# Patient Record
Sex: FEMALE | Race: WHITE | Employment: OTHER | URBAN - METROPOLITAN AREA
[De-identification: names, ages, dates, MRNs, and addresses within clinical notes are randomized per-mention and may not be internally consistent; named-entity substitution may affect disease eponyms.]

---

## 2018-05-22 ENCOUNTER — TELEPHONE (OUTPATIENT)
Dept: CARDIOLOGY CLINIC | Age: 78
End: 2018-05-22

## 2018-05-22 NOTE — TELEPHONE ENCOUNTER
PATIENT CALLED NEED A REFILL ON HER  METOPROLOL NOT SURE IF SHE SHOULD   TAKING A HALF OR WHOLE EACH BOTTLE   SAYS TO DIFFERENT THINGS SHE HAS APPT   ON 6/5/18 THX.

## 2018-05-22 NOTE — TELEPHONE ENCOUNTER
Spoke with pt . Verified 2 identifers. Pt wanted to know how she should take her metoprolol medication told her active order on file states take 1 tab daily also pt requesting refill on same medication refill then pended to dr Nikolai Gibbs to approve . Patient verbalize understanding .

## 2018-05-24 RX ORDER — METOPROLOL SUCCINATE 25 MG/1
25 TABLET, EXTENDED RELEASE ORAL DAILY
Qty: 90 TAB | Refills: 3 | Status: SHIPPED | OUTPATIENT
Start: 2018-05-24 | End: 2019-12-07

## 2018-06-05 ENCOUNTER — OFFICE VISIT (OUTPATIENT)
Dept: CARDIOLOGY CLINIC | Age: 78
End: 2018-06-05

## 2018-06-05 VITALS
DIASTOLIC BLOOD PRESSURE: 75 MMHG | SYSTOLIC BLOOD PRESSURE: 134 MMHG | BODY MASS INDEX: 23.55 KG/M2 | WEIGHT: 128 LBS | HEART RATE: 65 BPM | HEIGHT: 62 IN | RESPIRATION RATE: 12 BRPM | OXYGEN SATURATION: 97 %

## 2018-06-05 DIAGNOSIS — Z86.79 S/P RF ABLATION OPERATION FOR ARRHYTHMIA: ICD-10-CM

## 2018-06-05 DIAGNOSIS — G47.20 CIRCADIAN RHYTHM SLEEP DISORDER: ICD-10-CM

## 2018-06-05 DIAGNOSIS — E78.5 DYSLIPIDEMIA: ICD-10-CM

## 2018-06-05 DIAGNOSIS — I38 VALVULAR HEART DISEASE: ICD-10-CM

## 2018-06-05 DIAGNOSIS — Z98.890 S/P RF ABLATION OPERATION FOR ARRHYTHMIA: ICD-10-CM

## 2018-06-05 DIAGNOSIS — K21.00 GASTROESOPHAGEAL REFLUX DISEASE WITH ESOPHAGITIS: ICD-10-CM

## 2018-06-05 DIAGNOSIS — R06.09 DOE (DYSPNEA ON EXERTION): ICD-10-CM

## 2018-06-05 DIAGNOSIS — F32.A DEPRESSION, UNSPECIFIED DEPRESSION TYPE: ICD-10-CM

## 2018-06-05 DIAGNOSIS — R53.82 CHRONIC FATIGUE: Primary | ICD-10-CM

## 2018-06-05 DIAGNOSIS — F17.200 TOBACCO USE DISORDER: ICD-10-CM

## 2018-06-05 NOTE — MR AVS SNAPSHOT
[address] [place] [address] 
 
 
 [address] [place] [address] 
[phone] Patient: Mercy Carpenter MRN:  [id number] Visit Information Date & Time Provider Department Dept. Phone Encounter #  
 6/5/2018 10:30 AM Taryn Reyes MD [facility] [phone] [id number] Upcoming Health Maintenance Date Due DTaP/Tdap/Td series (1 - Tdap) 11/18/1999 ZOSTER VACCINE AGE 60> 2/16/2000 GLAUCOMA SCREENING Q2Y 4/16/2005 MEDICARE YEARLY EXAM 3/14/2018 COLONOSCOPY 3/14/2018 Influenza Age 5 to Adult 8/1/2018 Allergies as of 6/5/2018  Review Complete On: 6/5/2018 By: Karissa Dee LPN Severity Noted Reaction Type Reactions Azithromycin  10/30/2009    Nausea Only Loss of appetite, jittery Fosamax [Alendronate]  10/30/2009    Other (comments) Ulcers Zocor [Simvastatin] Low 10/30/2009   Side Effect Myalgia Able to take 10 mg daily only Current Immunizations  Reviewed on 12/29/2014 Name Date Influenza Vaccine Split 10/8/2012, 11/21/2011, 10/15/2010 TD Vaccine 11/17/1999 ZZZ-RETIRED (DO NOT USE) Pneumococcal Vaccine (Unspecified Type) 8/3/2012, 8/6/2002, 1/1/1986 Not reviewed this visit You Were Diagnosed With   
  
 Codes Comments Chronic fatigue    -  Primary ICD-10-CM: R53.82 
ICD-9-CM: 780.79 S/P RF ablation operation for arrhythmia     ICD-10-CM: Z98.890, Z86.79 
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 
ICD-9-CM: 786.09 Valvular heart disease     ICD-10-CM: I38 
ICD-9-CM: 424.90 Depression, unspecified depression type     ICD-10-CM: F32.9 ICD-9-CM: 299 Gastroesophageal reflux disease with esophagitis     ICD-10-CM: K21.0 ICD-9-CM: 530.11 Tobacco use disorder     ICD-10-CM: F17.200 ICD-9-CM: 305.1 Palpitations     ICD-10-CM: R00.2 ICD-9-CM: 785.1 Vitals BP Pulse Resp Height(growth percentile) Weight(growth percentile) SpO2  
 134/75 (BP 1 Location: Right arm, BP Patient Position: Sitting) 65 12 5' 2\" (1.575 m) 128 lb (58.1 kg) 97% BMI OB Status Smoking Status 23.41 kg/m2 Hysterectomy Current Every Day Smoker Vitals History BMI and BSA Data Body Mass Index Body Surface Area  
 23.41 kg/m 2 1.59 m 2 Preferred Pharmacy Pharmacy Name [id number] - [place] Serv[place], [address] [facility] [phone] Your Updated Medication List  
  
   
This list is accurate as of 6/5/18 11:23 AM.  Always use your most recent med list.  
  
  
  
  
 aspirin delayed-release 81 mg tablet Take 81 mg by mouth daily. 1 tab Taking 3 times a week CALCIUM 600 + D 600-125 mg-unit Tab Generic drug:  calcium-cholecalciferol (d3) Take  by mouth. Takes 1/2 pill daily  
  
 clonazePAM 0.5 mg tablet Commonly known as:  Morgan Casiano Take 1 tablet by mouth nightly as needed. Indications: sleep, stress  
  
 diclofenac 1 % Gel Commonly known as:  VOLTAREN Apply 4 g to affected area four (4) times daily. doxycycline 100 mg capsule Commonly known as:  Lilia Chyle Take 1 Cap by mouth two (2) times a day. FISH OIL 1,000 mg Cap Generic drug:  omega-3 fatty acids-vitamin e Take 1,000 mg by mouth daily. fluticasone 50 mcg/actuation nasal spray Commonly known as:  FLONASE  
USE TWO SPRAYS IN EACH NOSTRIL DAILY  
  
 levothyroxine 88 mcg tablet Commonly known as:  SYNTHROID  
TAKE ONE TABLET BY MOUTH EVERY MORNING BEFORE BREAKFAST LORazepam 0.5 mg tablet Commonly known as:  ATIVAN Take  by mouth.  
  
 lovastatin 20 mg tablet Commonly known as:  MEVACOR  
TAKE ONE TABLET BY MOUTH NIGHTLY AT BEDTIME  
  
 metoprolol succinate 25 mg XL tablet Commonly known as:  TOPROL-XL Take 1 Tab by mouth daily. MILK OF MAGNESIA 400 mg/5 mL suspension Generic drug:  magnesium hydroxide Take 30 mL by mouth daily as needed. montelukast 10 mg tablet Commonly known as:  SINGULAIR  
TAKE ONE TABLET BY MOUTH ONE TIME DAILY  
  
 raloxifene 60 mg tablet Commonly known as:  EVISTA TAKE 1 TABLET BY MOUTH ONCE DAILY ON MONDAY, WEDNESDAY, AND FRIDAY  
  
 tetracycline 500 mg capsule Commonly known as:  Sandrine Del Valle Take  by mouth Before breakfast, lunch, dinner and at bedtime. traMADol 50 mg tablet Commonly known as:  ULTRAM  
Take 50 mg by mouth every six (6) hours as needed for Pain. traZODone 50 mg tablet Commonly known as:  DESYREL  
TAKE ONE TABLET BY MOUTH NIGHTLY AT BEDTIME  
  
 TYLENOL EXTRA STRENGTH 500 mg tablet Generic drug:  acetaminophen Take 500 mg by mouth every six (6) hours as needed. Indications: ARTHRITIC PAIN  
  
 VENTOLIN HFA 90 mcg/actuation inhaler Generic drug:  albuterol Inhale two puffs by mouth every 4 to 6 hours as needed shortness of breath We Performed the Following AMB POC EKG ROUTINE W/ 12 LEADS, INTER & REP [68947 CPT(R)] Introducing [facility]! [employer] introduces Trustpilot patient portal. Now you can access parts of your medical record, email your doctor's office, and request medication refills online. 1. In your internet browser, go to [email or web address] 2. Click on the First Time User? Click Here link in the Sign In box. You will see the New Member Sign Up page. 3. Enter your Trustpilot Access Code exactly as it appears below. You will not need to use this code after youve completed the sign-up process. If you do not sign up before the expiration date, you must request a new code. · Trustpilot Access Code: [email or web address] Expires: 9/3/2018 11:21 AM 
 
4.  Enter the last four digits of your Social Security Number (xxxx) and Date of Birth (mm/dd/yyyy) as indicated and click Submit. You will be taken to the next sign-up page. 5. Create a Heliatek ID. This will be your Heliatek login ID and cannot be changed, so think of one that is secure and easy to remember. 6. Create a Heliatek password. You can change your password at any time. 7. Enter your Password Reset Question and Answer. This can be used at a later time if you forget your password. 8. Enter your e-mail address. You will receive e-mail notification when new information is available in [address] 9. Click Sign Up. You can now view and download portions of your medical record. 10. Click the Download Summary menu link to download a portable copy of your medical information. If you have questions, please visit the Frequently Asked Questions section of the Heliatek website. Remember, Heliatek is NOT to be used for urgent needs. For medical emergencies, dial 911. Now available from your iPhone and Android! Please provide this summary of care documentation to your next provider. Your primary care clinician is listed as Cristian Fernandez. If you have any questions after today's visit, please call [phone].

## 2018-06-05 NOTE — PATIENT INSTRUCTIONS
-- Please reduce the Metoprolol XL to 1/2 tablet; please call us in 2 weeks to let us know how this change is working  -- 1 year follow up    Heart-Healthy Diet: Care Instructions  Your Care Instructions    A heart-healthy diet has lots of vegetables, fruits, nuts, beans, and whole grains, and is low in salt. It limits foods that are high in saturated fat, such as meats, cheeses, and fried foods. It may be hard to change your diet, but even small changes can lower your risk of heart attack and heart disease. Follow-up care is a key part of your treatment and safety. Be sure to make and go to all appointments, and call your doctor if you are having problems. It's also a good idea to know your test results and keep a list of the medicines you take. How can you care for yourself at home? Watch your portions  · Learn what a serving is. A \"serving\" and a \"portion\" are not always the same thing. Make sure that you are not eating larger portions than are recommended. For example, a serving of pasta is ½ cup. A serving size of meat is 2 to 3 ounces. A 3-ounce serving is about the size of a deck of cards. Measure serving sizes until you are good at [place]" them. Keep in mind that restaurants often serve portions that are 2 or 3 times the size of one serving. · To keep your energy level up and keep you from feeling hungry, eat often but in smaller portions. · Eat only the number of calories you need to stay at a healthy weight. If you need to lose weight, eat fewer calories than your body burns (through exercise and other physical activity). Eat more fruits and vegetables  · Eat a variety of fruit and vegetables every day. Dark green, deep orange, red, or yellow fruits and vegetables are especially good for you. Examples include spinach, carrots, peaches, and berries. · Keep carrots, celery, and other veggies handy for snacks.  Buy fruit that is in season and store it where you can see it so that you will be tempted to eat it. · Cook dishes that have a lot of veggies in them, such as stir-fries and soups. Limit saturated and trans fat  · Read food labels, and try to avoid saturated and trans fats. They increase your risk of heart disease. Trans fat is found in many processed foods such as cookies and crackers. · Use olive or canola oil when you cook. Try cholesterol-lowering spreads, such as Benecol or Take Control. · Bake, broil, grill, or steam foods instead of frying them. · Choose lean meats instead of high-fat meats such as hot dogs and sausages. Cut off all visible fat when you prepare meat. · Eat fish, skinless poultry, and meat alternatives such as soy products instead of high-fat meats. Soy products, such as tofu, may be especially good for your heart. · Choose low-fat or fat-free milk and dairy products. Eat fish  · Eat at least two servings of fish a week. Certain fish, such as salmon and tuna, contain omega-3 fatty acids, which may help reduce your risk of heart attack. Eat foods high in fiber  · Eat a variety of grain products every day. Include whole-grain foods that have lots of fiber and nutrients. Examples of whole-grain foods include oats, whole wheat bread, and brown rice. · Buy whole-grain breads and cereals, instead of white bread or pastries. Limit salt and sodium  · Limit how much salt and sodium you eat to help lower your blood pressure. · Taste food before you salt it. Add only a little salt when you think you need it. With time, your taste buds will adjust to less salt. · Eat fewer snack items, fast foods, and other high-salt, processed foods. Check food labels for the amount of sodium in packaged foods. · Choose low-sodium versions of canned goods (such as soups, vegetables, and beans). Limit sugar  · Limit drinks and foods with added sugar. These include candy, desserts, and soda pop.   Limit alcohol  · Limit alcohol to no more than 2 drinks a day for men and 1 drink a day for women. Too much alcohol can cause health problems. When should you call for help? Watch closely for changes in your health, and be sure to contact your doctor if:  ? · You would like help planning heart-healthy meals. Where can you learn more? Go to http://marcel-veronica.info/. Enter V137 in the search box to learn more about \"Heart-Healthy Diet: Care Instructions. \"  Current as of: September 21, 2016  Content Version: 11.4  © 9294-0037 [employer], [employer]. Care instructions adapted under license by [employer] (which disclaims liability or warranty for this information). If you have questions about a medical condition or this instruction, always ask your healthcare professional. [address] any warranty or liability for your use of this information.

## 2018-06-05 NOTE — PROGRESS NOTES
Chief Complaint   Patient presents with    Shortness of Breath     pt c/o headache, off balance       1. Have you been to the ER, urgent care clinic since your last visit? Hospitalized since your last visit? No    2. Have you seen or consulted any other health care providers outside of the [address] [place] [address] since your last visit? Include any pap smears or colon screening.  No

## 2018-06-05 NOTE — PROGRESS NOTES
[facility]   [address] [facility], [address] [place] [address]                                          NEW PATIENT HPI/FOLLOW-UP      NAME:  Chel Ruiz   :   [id number]   MRN:   [id number]   PCP:  Rimma Flores MD           Subjective: The patient is a 66y.o. year old female with h/o Depression/anxiety, GERD, mitral valve disorder, pSVT s/p RF ablation, hypothyroidism, vitamin D deficiency, tobacco use disorder and circadian rhythm disorder who returns for a routine follow-up. Since the last visit, patient reports fatigue, which is chronic and not related to activity. She reports long term trouble sleeping and using Clonazepam at night to help her sleep. She mostly does embroidering, but states \"I'm able to keep the household chores up\" and she gets out into the garden as much as the weather allows. She reports some NEGRO with activity and admits to continued smoking, maybe 4 cigarettes/day. She notes one episode of \"anxiety\" but cannot clearly state if there was an emotional trigger or if she felt palpitations during this episode. Denies change in exercise tolerance, chest pain, edema, medication intolerance, palpitations, PND/orthopnea, wheezing, sputum, syncope, dizziness or light headedness. Doing satisfactorily. Review of Systems  General: Pt denies excessive weight gain or loss. Pt is able to conduct ADL's. Respiratory: +shortness of breath, NEGRO, Denies wheezing or stridor.   Cardiovascular: Denies precordial pain, palpitations, edema or PND  Gastrointestinal: Denies poor appetite, indigestion, abdominal pain or blood in stool  Peripheral vascular: Denies claudication, leg cramps  Neuropsychiatric: +fatigue Denies paresthesias,tingling,numbness,anxiety,depression,  Musculoskeletal: Denies pain,tenderness, soreness,swelling      Past Medical History:   Diagnosis Date    Allergy, unspecified not elsewhere classified     Anxiety state, unspecified     Breast calcification seen on mammogram 2011    bilaterally.  Bursitis of hip     with Tendonitis. Dr. Cristina Tee. Dr. Keith Barillas. Dr. Allyson Romano.  Cataract 2013    bilaterally. Dr. Nataliya Pang.  COPD     Dry eye syndrome     Dr. Trinh Dempsey Gallstone 2002    GERD (gastroesophageal reflux disease)     H. pylori positive Txd. Dr. Ann Pedlar    Heart murmur 12/2011    Moderate MR. Moderate TR. Moderate AoR. Normal Stress Test.  Dr. Michelle Naranjo.  Hemorrhoids, internal 06/2003    Dr. Bharti Llamas Hiatal hernia     Dr. Bharti Llamas Hyperthyroidism 1980s    Txd with APARICIO    IBS (irritable bowel syndrome) 1958    Intractable ophthalmic migraine     Lactose intolerance     Lyme disease 09/2011    Txd with Doxy x 21. Dr. Dori Mario Osteopenia 12/2002, 09/2004,8/2012    Dr. Philip Monsivais.  Other and unspecified hyperlipidemia     Paroxysmal SVT (supraventricular tachycardia) ([facility] [address] 09/01/14    Dr. Anna Barajas. Dr. Mason Muir.    Polyuria 2013    Psoriasis 08/2011    Left ear. Dr. Anny Plunkett RBBB (right bundle branch block) 12/2011    Dr. Anna Barajas. Normal Stress Test 12/2011.  SBO (small bowel obstruction) ([address] 1970s    Unspecified hypothyroidism 1980s    Valvular heart disease 2014    mild-mod MR.  TR.  AI.   Dr. Ayaz Spann D deficiency 06/2011     Patient Active Problem List    Diagnosis Date Noted    S/P RF ablation operation for SVT arrhythmia 2015 06/05/2018    Paroxysmal SVT (supraventricular tachycardia) ([address] 12/22/2014    Rapid palpitations 12/22/2014    Circadian rhythm sleep disorder 12/22/2014    Depression-anxiety 12/11/2014    Fatigue 12/11/2014    Valvular heart disease--mild-mod MR/TR/AI 09/11/2014    DJD (degenerative joint disease)--hips--painful 09/11/2014    Palpitation 09/10/2014    Chronic insomnia 08/26/2014    COPD (chronic obstructive pulmonary disease) ([facility] [address] 08/26/2014    Breast calcification seen on mammogram     Tobacco use disorder 02/03/2014    Dyslipidemia 02/03/2014    Chronic airway obstruction, not elsewhere classified 02/03/2014    NEGRO (dyspnea on exertion) 02/03/2014    Hypothyroid 11/18/2013    Vitamin D deficiency 11/18/2013    Mitral valve disorders(424.0) 06/28/2012    Headache(784.0) 10/06/2011    Psoriasis 08/01/2011    Intractable ophthalmic migraine     Dry eye syndrome     Anxiety state, unspecified     GERD (gastroesophageal reflux disease)     Hemorrhoids, internal 06/01/2003      Past Surgical History:   Procedure Laterality Date    APPENDECTOMY  1957    COLONOSCOPY  06/2003    Dr. Gia Madison. due q 10 yrs    HX BREAST BIOPSY  2011    Right. [facility]. [facility].  HX CATARACT REMOVAL Bilateral 10/15/2013 (Right), 11/06/2013 (Left)    Dr. Mackenzie Harris  [id number]    Dr. Mattie MD    HX LAP CHOLECYSTECTOMY      HX ORTHOPAEDIC      some type of foot surgery 30 yrs ago    HX ARCELIA AND BSO  1973    due to fibroids then SBO    Bill Mendoza  2002    Dr. Ashley Davalos.  [place] COLSC FLX W/RMVL OF TUMOR POLYP LESION SNARE TQ  3/14/2013         [place] COLSC FLX W/RMVL OF TUMOR POLYP LESION SNARE TQ  3/14/2013          Allergies   Allergen Reactions    Azithromycin Nausea Only     Loss of appetite, jittery    Fosamax [Alendronate] Other (comments)     Ulcers    Zocor [Simvastatin] Myalgia     Able to take 10 mg daily only      Family History   Problem Relation Age of Onset    Heart Disease Mother      chf    Hypertension Mother     Arthritis-osteo Mother     Stroke Mother     Thyroid Disease Sister     Diabetes Maternal Grandmother     Kidney Disease Other      tumor present      Social History     Social History    Marital status:      Spouse name: N/A    Number of children: N/A    Years of education: N/A     Occupational History    Not on file.      Social History Main Topics    Smoking status: Current Every Day Smoker     Packs/day: 0.30     Years: 50.00     Types: Cigarettes    Smokeless tobacco: Never Used      Comment: 2 cigarettes daily    Alcohol use No    Drug use: No    Sexual activity: Not Currently     Other Topics Concern    Not on file     Social History Narrative      Current Outpatient Prescriptions   Medication Sig    raloxifene (EVISTA) 60 mg tablet TAKE 1 TABLET BY MOUTH ONCE DAILY ON MONDAY, WEDNESDAY, AND FRIDAY    LORazepam (ATIVAN) 0.5 mg tablet Take  by mouth.  traMADol (ULTRAM) 50 mg tablet Take 50 mg by mouth every six (6) hours as needed for Pain.  montelukast (SINGULAIR) 10 mg tablet TAKE ONE TABLET BY MOUTH ONE TIME DAILY     fluticasone (FLONASE) 50 mcg/actuation nasal spray USE TWO SPRAYS IN EACH NOSTRIL DAILY     VENTOLIN HFA 90 mcg/actuation inhaler Inhale two puffs by mouth every 4 to 6 hours as needed shortness of breath    lovastatin (MEVACOR) 20 mg tablet TAKE ONE TABLET BY MOUTH NIGHTLY AT BEDTIME     levothyroxine (SYNTHROID) 88 mcg tablet TAKE ONE TABLET BY MOUTH EVERY MORNING BEFORE BREAKFAST (Patient taking differently: 100 mcg. TAKE ONE TABLET BY MOUTH EVERY MORNING BEFORE BREAKFAST)    clonazePAM (KLONOPIN) 0.5 mg tablet Take 1 tablet by mouth nightly as needed. Indications: sleep, stress    calcium-cholecalciferol, d3, (CALCIUM 600 + D) 600-125 mg-unit tab Take  by mouth. Takes 1/2 pill daily    aspirin delayed-release 81 mg tablet Take 81 mg by mouth daily. 1 tab Taking 3 times a week    acetaminophen (TYLENOL EXTRA STRENGTH) 500 mg tablet Take 500 mg by mouth every six (6) hours as needed. Indications: ARTHRITIC PAIN    magnesium hydroxide (MILK OF MAGNESIA) 400 mg/5 mL suspension Take 30 mL by mouth daily as needed.  omega-3 fatty acids-vitamin e (FISH OIL) 1,000 mg Cap Take 1,000 mg by mouth daily.  metoprolol succinate (TOPROL-XL) 25 mg XL tablet Take 1 Tab by mouth daily.     doxycycline (MONODOX) 100 mg capsule Take 1 Cap by mouth two (2) times a day.  tetracycline (ACHROMYCIN, SUMYCIN) 500 mg capsule Take  by mouth Before breakfast, lunch, dinner and at bedtime.  traZODone (DESYREL) 50 mg tablet TAKE ONE TABLET BY MOUTH NIGHTLY AT BEDTIME     diclofenac (VOLTAREN) 1 % topical gel Apply 4 g to affected area four (4) times daily. No current facility-administered medications for this visit. I have reviewed the nurses notes, vitals, problem list, allergy list, medical history, family medical, social history and medications. Objective:     Physical Exam:     Vitals:    06/05/18 1036 06/05/18 1041   BP: 133/69 134/75   Pulse: 63 65   Resp: 12    SpO2: 97%    Weight: 128 lb (58.1 kg)    Height: 5' 2\" (1.575 m)     Body mass index is 23.41 kg/(m^2). General: WDWN elderly woman, in no acute distress. Pleasant affect. HEENT: No carotid bruits, no JVD, trach is midline. Heart:  Normal S1/S2 negative S3 or S4. Regular, no murmur, gallop or rub.   Respiratory: Clear bilaterally, no wheezing or rales  Abdomen:   Soft, non-tender, bowel sounds are active.   Extremities:  No edema, normal cap refill, no cyanosis. Neuro: A&Ox3, speech clear, gait stable. Skin: Skin color is normal. No rashes or lesions. No diaphoresis. Vascular: 2+ pulses symmetric in all extremities        Data Review:       Cardiographics:    EKG interpretation:  Rhythm: sinus bradycardia; and regular . Rate (approx.): 57; Axis: normal; P wave: prolonged, consider LAE; QRS interval: RBBB; ST/T wave: normal. This EKG was interpreted by Edmund Villagomez PA-C.       Cardiology Labs:    Results for orders placed or performed during the hospital encounter of 09/01/14   EKG, 12 LEAD, INITIAL   Result Value Ref Range    Ventricular Rate 78 BPM    Atrial Rate 78 BPM    P-R Interval 168 ms    QRS Duration 116 ms    Q-T Interval 406 ms    QTC Calculation (Bezet) 462 ms    Calculated P Axis 48 degrees    Calculated R Axis 23 degrees    Calculated T Axis 36 degrees    Diagnosis       Normal sinus rhythm  Right bundle branch block  No previous ECGs available  Confirmed by Jimi Kamara ([id number]) on 9/2/2014 10:24:39 PM       Lab Results   Component Value Date/Time    Cholesterol, total 181 08/26/2014 09:55 AM    HDL Cholesterol 63 08/26/2014 09:55 AM    LDL, calculated 90 08/26/2014 09:55 AM    Triglyceride 140 08/26/2014 09:55 AM    CHOL/HDL Ratio 3.2 05/06/2010 08:16 AM       Lab Results   Component Value Date/Time    Sodium 143 01/19/2015 10:18 AM    Potassium 4.7 01/19/2015 10:18 AM    Chloride 103 01/19/2015 10:18 AM    CO2 24 01/19/2015 10:18 AM    Anion gap 5 09/01/2014 08:45 PM    Glucose 75 01/19/2015 10:18 AM    BUN 14 01/19/2015 10:18 AM    Creatinine 0.81 01/19/2015 10:18 AM    BUN/Creatinine ratio 17 01/19/2015 10:18 AM    GFR est AA 83 01/19/2015 10:18 AM    GFR est non-AA 72 01/19/2015 10:18 AM    Calcium 9.1 01/19/2015 10:18 AM    Bilirubin, total 0.2 01/19/2015 10:18 AM    AST (SGOT) 21 01/19/2015 10:18 AM    Alk. phosphatase 56 01/19/2015 10:18 AM    Protein, total 6.0 01/19/2015 10:18 AM    Albumin 4.1 01/19/2015 10:18 AM    Globulin 3.5 09/01/2014 08:45 PM    A-G Ratio 2.2 01/19/2015 10:18 AM    ALT (SGPT) 13 01/19/2015 10:18 AM          Assessment:       ICD-10-CM ICD-9-CM    1. Chronic fatigue R53.82 780.79    2. S/P RF ablation operation for SVT arrhythmia 2015 Z98.890 V45.89     Z86.79     3. Circadian rhythm sleep disorder G47.20 327.30    4. Dyslipidemia E78.5 272.4 AMB POC EKG ROUTINE W/ 12 LEADS, INTER & REP   5. NEGRO (dyspnea on exertion) R06.09 786.09    6. Valvular heart disease--mild-mod MR/TR/AI I38 424.90    7. Depression, unspecified depression type F32.9 311    8. Gastroesophageal reflux disease with esophagitis K21.0 530.11    9. Tobacco use disorder F17.200 305.1          Discussion: Patient presents at this time stable from a cardiac perspective. BP was well controlled; radial pulses equal bilaterally.  Chronic fatigue could have many sources including medications, depression, hypothyroidism, palpitations/arrhythmia, heart failure, COPD (pt is long time and current smoker) or sleep disturbance. Thyroid seems to be well followed and managed by PCP. Pt denies palpitations or at rest symptoms of CP or SOB, no edema. Doubt CHF. Pt offered that she and her  will be celebrating their 61st wedding anniversary in July. Congratulations were given. Pt states she is very proud of this. Pleased with present status. Discussed/seen with Dr. Teresa Marquis: 1. Decrease Metoprolol XL to 12.5 mg daily    2. Lipid profile and labs followed by PCP. 3. Encouraged to exercise to tolerance, stop smoking and follow low fat, low cholesterol, low sodium predominantly Plant-based (consider Mediterranean) diet. 4.Follow up: 1 year   --  Call with questions or concerns. I have discussed the diagnosis with the patient and the intended plan as seen in the above orders. The patient has received an after-visit summary and questions were answered concerning future plans. I have discussed any concerning medication side effects and warnings with the patient as well. Patient seen and examined. All pertinent data reviewed. I have reviewed detailed note as outlined by Natalie Yanez. Case discussed with Nursing/medical assistant staff and Natalie Yanez. Patient c/o ongoing chronic fatigue--multifactorial. Suspect med interactions contributing. Will reduce BB to 25 mg qday. Patient states she will discuss reduction tramadol for headache and clonazepam for sleep with PCP. Plans as outlined.       Deanna Roman PA-C  6/5/2018     RICKIE Pruitt

## 2019-04-22 ENCOUNTER — HOSPITAL ENCOUNTER (OUTPATIENT)
Dept: MRI IMAGING | Age: 79
Discharge: HOME | End: 2019-04-22
Attending: NEUROLOGICAL SURGERY
Payer: MEDICARE

## 2019-04-22 DIAGNOSIS — G93.9 BRAIN LESION: ICD-10-CM

## 2019-04-22 PROCEDURE — A9575 INJ GADOTERATE MEGLUMI 0.1ML: HCPCS | Performed by: NEUROLOGICAL SURGERY

## 2019-04-22 PROCEDURE — 74011636320 HC RX REV CODE- 636/320: Performed by: NEUROLOGICAL SURGERY

## 2019-04-22 PROCEDURE — 70553 MRI BRAIN STEM W/O & W/DYE: CPT

## 2019-04-22 RX ADMIN — GADOTERATE MEGLUMINE 15 ML: 376.9 INJECTION INTRAVENOUS at 09:00

## 2019-05-20 ENCOUNTER — OFFICE VISIT (OUTPATIENT)
Dept: URGENT CARE | Age: 79
End: 2019-05-20

## 2019-05-20 VITALS
RESPIRATION RATE: 18 BRPM | HEART RATE: 64 BPM | SYSTOLIC BLOOD PRESSURE: 166 MMHG | HEIGHT: 62 IN | BODY MASS INDEX: 24.48 KG/M2 | OXYGEN SATURATION: 97 % | DIASTOLIC BLOOD PRESSURE: 70 MMHG | TEMPERATURE: 97.4 F | WEIGHT: 133 LBS

## 2019-05-20 DIAGNOSIS — S30.0XXA CONTUSION OF SACRUM, INITIAL ENCOUNTER: ICD-10-CM

## 2019-05-20 DIAGNOSIS — M54.9 ACUTE MIDLINE BACK PAIN, UNSPECIFIED BACK LOCATION: Primary | ICD-10-CM
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
## 2019-05-20 NOTE — PROGRESS NOTES
Pt states she tripped and fell onto her bottom 2 weeks ago in her home and has had some pain in her low back since then. Called and Dr called in some diclofenac but no better. Called  today and told her to come in here. Also, tick bit over the weekend. No aching or rash. Pulled off. Less than a day of exposure, no engorged. The history is provided by the patient. Fall   This is a new problem. The current episode started more than 1 week ago. The problem occurs constantly. The problem has not changed since onset. Pertinent negatives include no chest pain, no abdominal pain, no headaches and no shortness of breath. Exacerbated by: cetain activities and certain ways of sitting. Nothing relieves the symptoms. Treatments tried: NSAID and ice. The treatment provided no relief. Past Medical History:   Diagnosis Date    Allergy, unspecified not elsewhere classified     Anxiety state, unspecified     Breast calcification seen on mammogram 2011    bilaterally.  Bursitis of hip     with Tendonitis. Dr. Ayala Moore. Dr. Stephanie Aguilar. Dr. Agustina Perez.  Cataract 2013    bilaterally. Dr. Ronaldo Nesbitt.  COPD     Dry eye syndrome     Dr. Lana Augustin Gallstone 2002    GERD (gastroesophageal reflux disease)     H. pylori positive Txd. Dr. Tj Gómez    Heart murmur 12/2011    Moderate MR. Moderate TR. Moderate AoR. Normal Stress Test.  Dr. Vanessa Jimenez.  Hemorrhoids, internal 06/2003    Dr. Victoria Code Hiatal hernia     Dr. Victoria Code Hyperthyroidism 1980s    Txd with APARICIO    IBS (irritable bowel syndrome) 1958    Intractable ophthalmic migraine     Lactose intolerance     Lyme disease 09/2011    Txd with Doxy x 21. Dr. Lynn Chol Osteopenia 12/2002, 09/2004,8/2012    Dr. Malou Garcia.  Other and unspecified hyperlipidemia     Paroxysmal SVT (supraventricular tachycardia) ([facility] [address] 09/01/14    Dr. Kyree Bell.   Dr. Jasmyn Crowley.    Polyuria 2013    Psoriasis 08/2011 Left ear. Dr. Matthew Day RBBB (right bundle branch block) 12/2011    Dr. Vinh Johnson. Normal Stress Test 12/2011.  SBO (small bowel obstruction) ([facility] [address] 1970s    Unspecified hypothyroidism 1980s    Valvular heart disease 2014    mild-mod MR.  TRBritton BARRON. Dr. Melissa Figueroa D deficiency 06/2011        Past Surgical History:   Procedure Laterality Date   [address] COLONOSCOPY  06/2003    Dr. Mitchell Miller. due q 10 yrs    HX BREAST BIOPSY  2011    [facility]. [facility]. [facility].  HX CATARACT REMOVAL Bilateral 10/15/2013 (Right), 11/06/2013 (Left)    Dr. Caro Dickson  [id number]    Dr. Adalid Garrett MD    HX LAP CHOLECYSTECTOMY      HX ORTHOPAEDIC      some type of foot surgery 30 yrs ago    HX ARCELIA AND BSO  1973    due to fibroids then SBO    Khurram Brayan  2002    Dr. Dorys Hill.     [place] COLSC FLX W/RMVL OF TUMOR POLYP LESION SNARE TQ  3/14/2013         [place] COLSC FLX W/RMVL OF TUMOR POLYP LESION SNARE TQ  3/14/2013              Family History   Problem Relation Age of Onset    Heart Disease Mother         chf    Hypertension Mother     Arthritis-osteo Mother     Stroke Mother     Thyroid Disease Sister     Diabetes Maternal Grandmother     Kidney Disease Other         tumor present        Social History     Socioeconomic History    Marital status:      Spouse name: Not on file    Number of children: Not on file    Years of education: Not on file    Highest education level: Not on file   Occupational History    Not on file   Social Needs    Financial resource strain: Not on file    Food insecurity:     Worry: Not on file     Inability: Not on file    Transportation needs:     Medical: Not on file     Non-medical: Not on file   Tobacco Use    Smoking status: Current Every Day Smoker     Packs/day: 0.30     Years: 50.00     Pack years: 15.00     Types: Cigarettes    Smokeless tobacco: Never Used   [address] [place] [address] Tobacco comment: 2 cigarettes daily   Substance and Sexual Activity    Alcohol use: No    Drug use: No    Sexual activity: Not Currently   Lifestyle    Physical activity:     Days per week: Not on file     Minutes per session: Not on file    Stress: Not on file   Relationships    Social connections:     Talks on phone: Not on file     Gets together: Not on file     Attends Sikhism service: Not on file     Active member of club or organization: Not on file     Attends meetings of clubs or organizations: Not on file     Relationship status: Not on file    Intimate partner violence:     Fear of current or ex partner: Not on file     Emotionally abused: Not on file     Physically abused: Not on file     Forced sexual activity: Not on file   Other Topics Concern    Not on file   Social History Narrative    Not on file                ALLERGIES: Azithromycin; Fosamax [alendronate]; and Zocor [simvastatin]    Review of Systems   Constitutional: Negative. Respiratory: Negative. Negative for shortness of breath. Cardiovascular: Negative for chest pain. Gastrointestinal: Negative for abdominal pain. Genitourinary: Negative. No loss of bowel or bladder function   Musculoskeletal: Positive for back pain. Skin: Negative. Tick bite left medial knee   Neurological: Negative for weakness, numbness and headaches. No numbness or perianal numbness        Vitals:    05/20/19 1039 05/20/19 1042   BP: 180/76 166/70   Pulse: 64    Resp: 18    Temp: 97.4 °F (36.3 °C)    SpO2: 97%    Weight: 133 lb (60.3 kg)    Height: 5' 2\" (1.575 m)        Physical Exam   Constitutional: She is oriented to person, place, and time. She appears well-developed and well-nourished. HENT:   Head: Normocephalic and atraumatic. Mouth/Throat: Oropharynx is clear and moist. No oropharyngeal exudate. Eyes: Pupils are equal, round, and reactive to light.  Conjunctivae and EOM are normal. Right eye exhibits no discharge. Left eye exhibits no discharge. No scleral icterus. Neck: Normal range of motion. No tracheal deviation present. No thyromegaly present. Cardiovascular: Normal rate, regular rhythm and normal heart sounds. No murmur heard. Pulmonary/Chest: Effort normal and breath sounds normal. No respiratory distress. She has no wheezes. She has no rales. Abdominal: Soft. Bowel sounds are normal. She exhibits no distension. There is no tenderness. There is no rebound and no guarding. Musculoskeletal: Normal range of motion. She exhibits no edema, tenderness or deformity. No pain on palpation of the sacrum    Lymphadenopathy:     She has no cervical adenopathy. Neurological: She is alert and oriented to person, place, and time. She has normal reflexes. She displays normal reflexes. No cranial nerve deficit. She exhibits normal muscle tone. Coordination normal.   Stable gait, NVI distally   Skin: Skin is warm. No rash noted. No erythema. No bruises, small tick bite noted with scabbing present,  no FB, Mild local blanchable erythema. No erythema migrans. Area is NT. Psychiatric: She has a normal mood and affect. Her behavior is normal. Judgment and thought content normal.   Nursing note and vitals reviewed.       MDM    Procedures

## 2019-05-20 NOTE — PATIENT INSTRUCTIONS
Rest and seek medical care for increased problems, any questions or concern including but not limited to the ones discussed with you here today. If you notice increase redness or and aching please call your doctor as you may need to be started on antibiotics but as your exposure was brief and there appears to only be a local reaction, I do not think you need antibiotics at this point. Also, use tylenol as directed for your back pain and follow up with your doctor as you may need advanced imaging and testing of pain persists in your back. Tick Bite: Care Instructions  Your Care Instructions    Ticks are small spiderlike animals. They bite to fasten themselves onto your skin and feed on your blood. Ticks can carry diseases. But most ticks do not carry diseases, and most tick bites do not cause serious health problems. Some people may have an allergic reaction to a tick bite. This reaction may be mild, with symptoms like itching and swelling. In rare cases, a severe allergic reaction may occur. Most of the time, all you need to do for a tick bite is relieve any symptoms you may have. Follow-up care is a key part of your treatment and safety. Be sure to make and go to all appointments, and call your doctor if you are having problems. It's also a good idea to know your test results and keep a list of the medicines you take. How can you care for yourself at home? · Put ice or a cold pack on the bite for 15 to 20 minutes once an hour. Put a thin cloth between the ice and your skin. · Try an over-the-counter medicine to relieve itching, redness, swelling, and pain. Be safe with medicines. Read and follow all instructions on the label. ? Take an antihistamine medicine, such as a nondrowsy one like loratadine (Claritin) or one that might make you sleepy like diphenhydramine (Benadryl). These medicines may help relieve itching, redness, and swelling. ?  Use a spray of local anesthetic that contains benzocaine, such as Solarcaine. It may help relieve pain. If your skin reacts to the spray, stop using it. ? Put calamine lotion on the skin. It may help relieve itching. To avoid tick bites  · Avoid ticks:  ? Learn where ticks are found in your community, and stay away from those areas if possible. ? Cover as much of your body as possible when you work or play in grassy or wooded areas. ? Use insect repellents, such as products containing DEET. You can spray them on your skin. ? Take steps to control ticks on your property if you live in an area where Lyme disease occurs. Clear leaves, brush, tall grasses, woodpiles, and stone fences from around your house and the edges of your yard or garden. This may help get rid of ticks. · When you come in from outdoors, check your body for ticks, including your groin, head, and underarms. The ticks may be about the size of a sesame seed. If no one else can help you check for ticks on your scalp, comb your hair with a fine-tooth comb. · If you find a tick, remove it quickly. Use tweezers to grasp the tick as close to its mouth (the part in your skin) as possible. Slowly pull the tick straight out--do not twist or yank--until its mouth releases from your skin. If part of the tick stays in the skin, leave it alone. It will likely come out on its own in a few days. · Ticks can come into your house on clothing, outdoor gear, and pets. These ticks can fall off and attach to you. ? Check your clothing and outdoor gear. Remove any ticks you find. Then put your clothing in a clothes dryer on high heat for 1 hour to kill any ticks that might remain. ? Check your pets for ticks after they have been outdoors. · When hiking in the woods, carry a small dry jar or ziplock bag. If you find a tick on your body, remove the tick and put it in the jar or bag. Store the container in the freezer so you can give it to your doctor if symptoms develop.  The tick can be tested to learn whether it is carrying the bacteria that cause Lyme disease. When should you call for help? Call 911 anytime you think you may need emergency care. For example, call if:    · You have symptoms of a severe allergic reaction. These may include:  ? Sudden raised, red areas (hives) all over your body. ? Swelling of the throat, mouth, lips, or tongue. ? Trouble breathing. ? Passing out (losing consciousness). Or you may feel very lightheaded or suddenly feel weak, confused, or restless.    Call your doctor now or seek immediate medical care if:    · You have signs of infection, such as:  ? Increased pain, swelling, warmth, or redness around the bite. ? Red streaks leading from the bite. ? Pus draining from the bite. ? A fever.    Watch closely for changes in your health, and be sure to contact your doctor if:    · You develop a new rash.     · You have joint pain.     · You are very tired.     · You have flu-like symptoms.     · You have symptoms for more than 1 week. Where can you learn more? Go to http://marcel-veronica.info/. Enter V842 in the search box to learn more about \"Tick Bite: Care Instructions. \"  Current as of: September 23, 2018  Content Version: 11.9  © 3227-7342 [employer]. Care instructions adapted under license by [employer] (which disclaims liability or warranty for this information). If you have questions about a medical condition or this instruction, always ask your healthcare professional. [place] [address] any warranty or liability for your use of this information. Bruises: Care Instructions  Your Care Instructions    Bruises occur when small blood vessels under the skin tear or rupture, most often from a twist, bump, or fall. Blood leaks into tissues under the skin and causes a black-and-blue spot that often turns colors, including purplish black, reddish blue, or yellowish green, as the bruise heals.   Bruises hurt, but most are not serious and will go away on their own within 2 to 4 weeks. Sometimes, gravity causes them to spread down the body. A leg bruise usually will take longer to heal than a bruise on the face or arms. Follow-up care is a key part of your treatment and safety. Be sure to make and go to all appointments, and call your doctor if you are having problems. It's also a good idea to know your test results and keep a list of the medicines you take. How can you care for yourself at home? · Take pain medicines exactly as directed. ? If the doctor gave you a prescription medicine for pain, take it as prescribed. ? If you are not taking a prescription pain medicine, ask your doctor if you can take an over-the-counter medicine. · Put ice or a cold pack on the area for 10 to 20 minutes at a time. Put a thin cloth between the ice and your skin. · If you can, prop up the bruised area on pillows as much as possible for the next few days. Try to keep the bruise above the level of your heart. When should you call for help? Call your doctor now or seek immediate medical care if:    · You have signs of infection, such as:  ? Increased pain, swelling, warmth, or redness. ? Red streaks leading from the bruise. ? Pus draining from the bruise. ? A fever.     · You have a bruise on your leg and signs of a blood clot, such as:  ? Increasing redness and swelling along with warmth, tenderness, and pain in the bruised area. ? Pain in your calf, back of the knee, thigh, or groin. ? Redness and swelling in your leg or groin.     · Your pain gets worse.    Watch closely for changes in your health, and be sure to contact your doctor if:    · You do not get better as expected. Where can you learn more? Go to http://marcel-veronica.info/. Enter [phone] [phone] in the search box to learn more about \"Bruises: Care Instructions. \"  Current as of: September 23, 2018  Content Version: 11.9  © 3148-1521 [employer].  Care instructions adapted under license by [employer] (which disclaims liability or warranty for this information). If you have questions about a medical condition or this instruction, always ask your healthcare professional. [address] any warranty or liability for your use of this information. Back Pain: Care Instructions  Your Care Instructions    Back pain has many possible causes. It is often related to problems with muscles and ligaments of the back. It may also be related to problems with the nerves, discs, or bones of the back. Moving, lifting, standing, sitting, or sleeping in an awkward way can strain the back. Sometimes you don't notice the injury until later. Arthritis is another common cause of back pain. Although it may hurt a lot, back pain usually improves on its own within several weeks. Most people recover in 12 weeks or less. Using good home treatment and being careful not to stress your back can help you feel better sooner. Follow-up care is a key part of your treatment and safety. Be sure to make and go to all appointments, and call your doctor if you are having problems. It's also a good idea to know your test results and keep a list of the medicines you take. How can you care for yourself at home? · Sit or lie in positions that are most comfortable and reduce your pain. Try one of these positions when you lie down:  ? Lie on your back with your knees bent and supported by large pillows. ? Lie on the floor with your legs on the seat of a sofa or chair. ? Lie on your side with your knees and hips bent and a pillow between your legs. ? Lie on your stomach if it does not make pain worse. · Do not sit up in bed, and avoid soft couches and twisted positions. Bed rest can help relieve pain at first, but it delays healing. Avoid bed rest after the first day of back pain. · Change positions every 30 minutes.  If you must sit for long periods of time, take breaks from sitting. Get up and walk around, or lie in a comfortable position. · Try using a heating pad on a low or medium setting for 15 to 20 minutes every 2 or 3 hours. Try a warm shower in place of one session with the heating pad. · You can also try an ice pack for 10 to 15 minutes every 2 to 3 hours. Put a thin cloth between the ice pack and your skin. · Take pain medicines exactly as directed. ? If the doctor gave you a prescription medicine for pain, take it as prescribed. ? If you are not taking a prescription pain medicine, ask your doctor if you can take an over-the-counter medicine. · Take short walks several times a day. You can start with 5 to 10 minutes, 3 or 4 times a day, and work up to longer walks. Walk on level surfaces and avoid hills and stairs until your back is better. · Return to work and other activities as soon as you can. Continued rest without activity is usually not good for your back. · To prevent future back pain, do exercises to stretch and strengthen your back and stomach. Learn how to use good posture, safe lifting techniques, and proper body mechanics. When should you call for help? Call your doctor now or seek immediate medical care if:    · You have new or worsening numbness in your legs.     · You have new or worsening weakness in your legs. (This could make it hard to stand up.)     · You lose control of your bladder or bowels.    Watch closely for changes in your health, and be sure to contact your doctor if:    · You have a fever, lose weight, or don't feel well.     · You do not get better as expected. Where can you learn more? Go to http://marcel-veronica.info/. Enter V746 in the search box to learn more about \"Back Pain: Care Instructions. \"  Current as of: September 20, 2018  Content Version: 11.9  © 8805-6693 [employer].  Care instructions adapted under license by [employer] (which disclaims liability or warranty for this information). If you have questions about a medical condition or this instruction, always ask your healthcare professional. [place] [address] any warranty or liability for your use of this information.

## 2019-12-07 ENCOUNTER — OFFICE VISIT (OUTPATIENT)
Dept: URGENT CARE | Age: 79
End: 2019-12-07

## 2019-12-07 VITALS
WEIGHT: 132 LBS | OXYGEN SATURATION: 97 % | TEMPERATURE: 97.9 F | BODY MASS INDEX: 24.29 KG/M2 | SYSTOLIC BLOOD PRESSURE: 140 MMHG | HEIGHT: 62 IN | HEART RATE: 77 BPM | RESPIRATION RATE: 18 BRPM | DIASTOLIC BLOOD PRESSURE: 65 MMHG

## 2019-12-07 DIAGNOSIS — R31.9 URINARY TRACT INFECTION WITH HEMATURIA, SITE UNSPECIFIED: Primary | ICD-10-CM

## 2019-12-07 DIAGNOSIS — N39.0 URINARY TRACT INFECTION WITH HEMATURIA, SITE UNSPECIFIED: Primary | ICD-10-CM

## 2019-12-07 LAB
BILIRUB UR QL STRIP: NEGATIVE
GLUCOSE UR-MCNC: NEGATIVE MG/DL
KETONES P FAST UR STRIP-MCNC: NEGATIVE MG/DL
PH UR STRIP: 5.5 [PH] (ref 4.6–8)
PROT UR QL STRIP: NEGATIVE
SP GR UR STRIP: 1.03 (ref 1–1.03)
UA UROBILINOGEN AMB POC: NORMAL (ref 0.2–1)
URINALYSIS CLARITY POC: NORMAL
URINALYSIS COLOR POC: NORMAL
URINE BLOOD POC: NORMAL
URINE LEUKOCYTES POC: NORMAL
URINE NITRITES POC: NEGATIVE

## 2019-12-07 RX ORDER — NITROFURANTOIN (MACROCRYSTALS) 100 MG/1
100 CAPSULE ORAL 2 TIMES DAILY
Qty: 14 CAP | Refills: 0 | Status: CANCELLED | OUTPATIENT
Start: 2019-12-07 | End: 2019-12-14

## 2019-12-07 RX ORDER — NITROFURANTOIN (MACROCRYSTALS) 100 MG/1
100 CAPSULE ORAL 2 TIMES DAILY
Qty: 14 CAP | Refills: 0 | Status: SHIPPED | OUTPATIENT
Start: 2019-12-07 | End: 2019-12-14

## 2019-12-11 LAB — BACTERIA UR CULT: NORMAL

## 2020-09-15 ENCOUNTER — HOSPITAL ENCOUNTER (OUTPATIENT)
Dept: MAMMOGRAPHY | Age: 80
Discharge: HOME | End: 2020-09-15
Attending: INTERNAL MEDICINE
Payer: MEDICARE

## 2020-09-15 DIAGNOSIS — Z12.31 VISIT FOR SCREENING MAMMOGRAM: ICD-10-CM

## 2020-09-15 PROCEDURE — 77067 SCR MAMMO BI INCL CAD: CPT

## 2021-04-17 ENCOUNTER — APPOINTMENT (OUTPATIENT)
Dept: GENERAL RADIOLOGY | Age: 81
DRG: 308 | End: 2021-04-17
Attending: STUDENT IN AN ORGANIZED HEALTH CARE EDUCATION/TRAINING PROGRAM
Payer: MEDICARE

## 2021-04-17 ENCOUNTER — HOSPITAL ENCOUNTER (INPATIENT)
Age: 81
LOS: 2 days | Discharge: HOME | DRG: 308 | End: 2021-04-19
Attending: STUDENT IN AN ORGANIZED HEALTH CARE EDUCATION/TRAINING PROGRAM | Admitting: HOSPITALIST
Payer: MEDICARE

## 2021-04-17 DIAGNOSIS — G30.0 EARLY ONSET ALZHEIMER'S DEMENTIA WITHOUT BEHAVIORAL DISTURBANCE (HCC): ICD-10-CM

## 2021-04-17 DIAGNOSIS — F02.80 EARLY ONSET ALZHEIMER'S DEMENTIA WITHOUT BEHAVIORAL DISTURBANCE (HCC): ICD-10-CM

## 2021-04-17 DIAGNOSIS — R06.02 SOB (SHORTNESS OF BREATH): Primary | ICD-10-CM

## 2021-04-17 DIAGNOSIS — G20 PARKINSON DISEASE (HCC): ICD-10-CM

## 2021-04-17 PROBLEM — J81.1 PULMONARY EDEMA: Status: ACTIVE | Noted: 2021-04-17

## 2021-04-17 PROBLEM — I48.91 NEW ONSET ATRIAL FIBRILLATION (HCC): Status: ACTIVE | Noted: 2021-04-17

## 2021-04-17 PROBLEM — J96.01 ACUTE RESPIRATORY FAILURE WITH HYPOXIA (HCC): Status: ACTIVE | Noted: 2021-04-17

## 2021-04-17 PROBLEM — R53.1 GENERALIZED WEAKNESS: Status: ACTIVE | Noted: 2021-04-17

## 2021-04-17 LAB
ALBUMIN SERPL-MCNC: 3.1 G/DL (ref 3.5–5)
ALBUMIN/GLOB SERPL: 0.7 {RATIO} (ref 1.1–2.2)
ALP SERPL-CCNC: 102 U/L (ref 45–117)
ALT SERPL-CCNC: 20 U/L (ref 12–78)
ANION GAP SERPL CALC-SCNC: 5 MMOL/L (ref 5–15)
APTT PPP: 24.5 SEC (ref 22.1–31)
AST SERPL-CCNC: 36 U/L (ref 15–37)
BASOPHILS # BLD: 0.1 K/UL (ref 0–0.1)
BASOPHILS NFR BLD: 1 % (ref 0–1)
BILIRUB SERPL-MCNC: 0.8 MG/DL (ref 0.2–1)
BNP SERPL-MCNC: 2017 PG/ML
BUN SERPL-MCNC: 19 MG/DL (ref 6–20)
BUN/CREAT SERPL: 18 (ref 12–20)
CALCIUM SERPL-MCNC: 8.6 MG/DL (ref 8.5–10.1)
CHLORIDE SERPL-SCNC: 109 MMOL/L (ref 97–108)
CK SERPL-CCNC: 147 U/L (ref 26–192)
CO2 SERPL-SCNC: 30 MMOL/L (ref 21–32)
COMMENT, HOLDF: NORMAL
COVID-19 RAPID TEST, COVR: NOT DETECTED
CREAT SERPL-MCNC: 1.06 MG/DL (ref 0.55–1.02)
CRP SERPL-MCNC: 1.1 MG/DL (ref 0–0.6)
D DIMER PPP FEU-MCNC: 0.89 MG/L FEU (ref 0–0.65)
DIFFERENTIAL METHOD BLD: NORMAL
EOSINOPHIL # BLD: 0.1 K/UL (ref 0–0.4)
EOSINOPHIL NFR BLD: 1 % (ref 0–7)
ERYTHROCYTE [DISTWIDTH] IN BLOOD BY AUTOMATED COUNT: 13.6 % (ref 11.5–14.5)
FERRITIN SERPL-MCNC: 26 NG/ML (ref 8–252)
FIBRINOGEN PPP-MCNC: 325 MG/DL (ref 200–475)
GLOBULIN SER CALC-MCNC: 4.3 G/DL (ref 2–4)
GLUCOSE SERPL-MCNC: 112 MG/DL (ref 65–100)
HCT VFR BLD AUTO: 40.6 % (ref 35–47)
HGB BLD-MCNC: 12.9 G/DL (ref 11.5–16)
IMM GRANULOCYTES # BLD AUTO: 0 K/UL (ref 0–0.04)
IMM GRANULOCYTES NFR BLD AUTO: 0 % (ref 0–0.5)
INR PPP: 1.1 (ref 0.9–1.1)
LACTATE SERPL-SCNC: 1 MMOL/L (ref 0.4–2)
LDH SERPL L TO P-CCNC: 420 U/L (ref 81–246)
LYMPHOCYTES # BLD: 2.2 K/UL (ref 0.8–3.5)
LYMPHOCYTES NFR BLD: 23 % (ref 12–49)
MAGNESIUM SERPL-MCNC: 2.4 MG/DL (ref 1.6–2.4)
MCH RBC QN AUTO: 30.5 PG (ref 26–34)
MCHC RBC AUTO-ENTMCNC: 31.8 G/DL (ref 30–36.5)
MCV RBC AUTO: 96 FL (ref 80–99)
MONOCYTES # BLD: 0.8 K/UL (ref 0–1)
MONOCYTES NFR BLD: 8 % (ref 5–13)
NEUTS SEG # BLD: 6.4 K/UL (ref 1.8–8)
NEUTS SEG NFR BLD: 67 % (ref 32–75)
NRBC # BLD: 0 K/UL (ref 0–0.01)
NRBC BLD-RTO: 0 PER 100 WBC
PHOSPHATE SERPL-MCNC: 3.6 MG/DL (ref 2.6–4.7)
PLATELET # BLD AUTO: 262 K/UL (ref 150–400)
PMV BLD AUTO: 10.1 FL (ref 8.9–12.9)
POTASSIUM SERPL-SCNC: 3.3 MMOL/L (ref 3.5–5.1)
PROCALCITONIN SERPL-MCNC: <0.05 NG/ML
PROT SERPL-MCNC: 7.4 G/DL (ref 6.4–8.2)
PROTHROMBIN TIME: 11.6 SEC (ref 9–11.1)
RBC # BLD AUTO: 4.23 M/UL (ref 3.8–5.2)
SAMPLES BEING HELD,HOLD: NORMAL
SARS-COV-2, COV2: NORMAL
SARS-COV-2, COV2: NORMAL
SODIUM SERPL-SCNC: 144 MMOL/L (ref 136–145)
SOURCE, COVRS: NORMAL
THERAPEUTIC RANGE,PTTT: NORMAL SECS (ref 58–77)
TROPONIN I SERPL-MCNC: <0.05 NG/ML
TSH SERPL DL<=0.05 MIU/L-ACNC: 2.44 UIU/ML (ref 0.36–3.74)
WBC # BLD AUTO: 9.8 K/UL (ref 3.6–11)

## 2021-04-17 PROCEDURE — 74011250636 HC RX REV CODE- 250/636: Performed by: HOSPITALIST

## 2021-04-17 PROCEDURE — 93005 ELECTROCARDIOGRAM TRACING: CPT

## 2021-04-17 PROCEDURE — 74011250637 HC RX REV CODE- 250/637: Performed by: STUDENT IN AN ORGANIZED HEALTH CARE EDUCATION/TRAINING PROGRAM

## 2021-04-17 PROCEDURE — 87635 SARS-COV-2 COVID-19 AMP PRB: CPT

## 2021-04-17 PROCEDURE — 86140 C-REACTIVE PROTEIN: CPT

## 2021-04-17 PROCEDURE — 85610 PROTHROMBIN TIME: CPT

## 2021-04-17 PROCEDURE — 65660000000 HC RM CCU STEPDOWN

## 2021-04-17 PROCEDURE — 83735 ASSAY OF MAGNESIUM: CPT

## 2021-04-17 PROCEDURE — 74011250637 HC RX REV CODE- 250/637: Performed by: HOSPITALIST

## 2021-04-17 PROCEDURE — 74011250636 HC RX REV CODE- 250/636: Performed by: STUDENT IN AN ORGANIZED HEALTH CARE EDUCATION/TRAINING PROGRAM

## 2021-04-17 PROCEDURE — 85025 COMPLETE CBC W/AUTO DIFF WBC: CPT

## 2021-04-17 PROCEDURE — 84100 ASSAY OF PHOSPHORUS: CPT

## 2021-04-17 PROCEDURE — 85384 FIBRINOGEN ACTIVITY: CPT

## 2021-04-17 PROCEDURE — 85379 FIBRIN DEGRADATION QUANT: CPT

## 2021-04-17 PROCEDURE — U0005 INFEC AGEN DETEC AMPLI PROBE: HCPCS

## 2021-04-17 PROCEDURE — 85730 THROMBOPLASTIN TIME PARTIAL: CPT

## 2021-04-17 PROCEDURE — 36415 COLL VENOUS BLD VENIPUNCTURE: CPT

## 2021-04-17 PROCEDURE — 71045 X-RAY EXAM CHEST 1 VIEW: CPT

## 2021-04-17 PROCEDURE — 83880 ASSAY OF NATRIURETIC PEPTIDE: CPT

## 2021-04-17 PROCEDURE — 99285 EMERGENCY DEPT VISIT HI MDM: CPT

## 2021-04-17 PROCEDURE — 74011000250 HC RX REV CODE- 250: Performed by: HOSPITALIST

## 2021-04-17 PROCEDURE — 84443 ASSAY THYROID STIM HORMONE: CPT

## 2021-04-17 PROCEDURE — 82728 ASSAY OF FERRITIN: CPT

## 2021-04-17 PROCEDURE — 82550 ASSAY OF CK (CPK): CPT

## 2021-04-17 PROCEDURE — 83605 ASSAY OF LACTIC ACID: CPT

## 2021-04-17 PROCEDURE — 83615 LACTATE (LD) (LDH) ENZYME: CPT

## 2021-04-17 PROCEDURE — 80053 COMPREHEN METABOLIC PANEL: CPT

## 2021-04-17 PROCEDURE — 94762 N-INVAS EAR/PLS OXIMTRY CONT: CPT

## 2021-04-17 PROCEDURE — 84484 ASSAY OF TROPONIN QUANT: CPT

## 2021-04-17 PROCEDURE — 84145 PROCALCITONIN (PCT): CPT

## 2021-04-17 RX ORDER — ENOXAPARIN SODIUM 100 MG/ML
1 INJECTION SUBCUTANEOUS EVERY 12 HOURS
Status: DISCONTINUED | OUTPATIENT
Start: 2021-04-17 | End: 2021-04-19 | Stop reason: HOSPADM

## 2021-04-17 RX ORDER — FUROSEMIDE 10 MG/ML
20 INJECTION INTRAMUSCULAR; INTRAVENOUS 2 TIMES DAILY
Status: DISCONTINUED | OUTPATIENT
Start: 2021-04-17 | End: 2021-04-19 | Stop reason: HOSPADM

## 2021-04-17 RX ORDER — DILTIAZEM HYDROCHLORIDE 30 MG/1
60 TABLET, FILM COATED ORAL 3 TIMES DAILY
Status: DISCONTINUED | OUTPATIENT
Start: 2021-04-17 | End: 2021-04-18

## 2021-04-17 RX ORDER — ATORVASTATIN CALCIUM 10 MG/1
10 TABLET, FILM COATED ORAL
Status: DISCONTINUED | OUTPATIENT
Start: 2021-04-17 | End: 2021-04-19 | Stop reason: HOSPADM

## 2021-04-17 RX ORDER — LABETALOL HYDROCHLORIDE 5 MG/ML
10 INJECTION, SOLUTION INTRAVENOUS ONCE
Status: DISCONTINUED | OUTPATIENT
Start: 2021-04-17 | End: 2021-04-17

## 2021-04-17 RX ORDER — LABETALOL HYDROCHLORIDE 5 MG/ML
10 INJECTION, SOLUTION INTRAVENOUS
Status: DISCONTINUED | OUTPATIENT
Start: 2021-04-17 | End: 2021-04-19 | Stop reason: HOSPADM

## 2021-04-17 RX ORDER — SODIUM CHLORIDE 0.9 % (FLUSH) 0.9 %
5-40 SYRINGE (ML) INJECTION EVERY 8 HOURS
Status: DISCONTINUED | OUTPATIENT
Start: 2021-04-17 | End: 2021-04-19 | Stop reason: HOSPADM

## 2021-04-17 RX ORDER — MONTELUKAST SODIUM 10 MG/1
10 TABLET ORAL
Status: DISCONTINUED | OUTPATIENT
Start: 2021-04-17 | End: 2021-04-19 | Stop reason: HOSPADM

## 2021-04-17 RX ORDER — DILTIAZEM HYDROCHLORIDE 5 MG/ML
10 INJECTION INTRAVENOUS ONCE
Status: COMPLETED | OUTPATIENT
Start: 2021-04-17 | End: 2021-04-17

## 2021-04-17 RX ORDER — PROMETHAZINE HYDROCHLORIDE 25 MG/1
12.5 TABLET ORAL
Status: DISCONTINUED | OUTPATIENT
Start: 2021-04-17 | End: 2021-04-19 | Stop reason: HOSPADM

## 2021-04-17 RX ORDER — DILTIAZEM HYDROCHLORIDE 30 MG/1
60 TABLET, FILM COATED ORAL 3 TIMES DAILY
Status: CANCELLED | OUTPATIENT
Start: 2021-04-17

## 2021-04-17 RX ORDER — FUROSEMIDE 10 MG/ML
20 INJECTION INTRAMUSCULAR; INTRAVENOUS DAILY
Status: DISCONTINUED | OUTPATIENT
Start: 2021-04-18 | End: 2021-04-17

## 2021-04-17 RX ORDER — ACETAMINOPHEN 325 MG/1
650 TABLET ORAL
Status: DISCONTINUED | OUTPATIENT
Start: 2021-04-17 | End: 2021-04-19 | Stop reason: HOSPADM

## 2021-04-17 RX ORDER — SODIUM CHLORIDE 0.9 % (FLUSH) 0.9 %
5-40 SYRINGE (ML) INJECTION AS NEEDED
Status: DISCONTINUED | OUTPATIENT
Start: 2021-04-17 | End: 2021-04-19 | Stop reason: HOSPADM

## 2021-04-17 RX ORDER — POTASSIUM CHLORIDE 20 MEQ/1
40 TABLET, EXTENDED RELEASE ORAL
Status: COMPLETED | OUTPATIENT
Start: 2021-04-17 | End: 2021-04-17

## 2021-04-17 RX ORDER — FUROSEMIDE 10 MG/ML
20 INJECTION INTRAMUSCULAR; INTRAVENOUS
Status: COMPLETED | OUTPATIENT
Start: 2021-04-17 | End: 2021-04-17

## 2021-04-17 RX ORDER — LEVOTHYROXINE SODIUM 100 UG/1
100 TABLET ORAL
Status: DISCONTINUED | OUTPATIENT
Start: 2021-04-18 | End: 2021-04-19 | Stop reason: HOSPADM

## 2021-04-17 RX ORDER — ACETAMINOPHEN 650 MG/1
650 SUPPOSITORY RECTAL
Status: DISCONTINUED | OUTPATIENT
Start: 2021-04-17 | End: 2021-04-19 | Stop reason: HOSPADM

## 2021-04-17 RX ORDER — DILTIAZEM HYDROCHLORIDE 30 MG/1
60 TABLET, FILM COATED ORAL 3 TIMES DAILY
Status: DISCONTINUED | OUTPATIENT
Start: 2021-04-17 | End: 2021-04-17

## 2021-04-17 RX ORDER — ONDANSETRON 2 MG/ML
4 INJECTION INTRAMUSCULAR; INTRAVENOUS
Status: DISCONTINUED | OUTPATIENT
Start: 2021-04-17 | End: 2021-04-19 | Stop reason: HOSPADM

## 2021-04-17 RX ORDER — POTASSIUM CHLORIDE 20 MEQ/1
20 TABLET, EXTENDED RELEASE ORAL 2 TIMES DAILY
Status: DISCONTINUED | OUTPATIENT
Start: 2021-04-17 | End: 2021-04-19 | Stop reason: HOSPADM

## 2021-04-17 RX ORDER — DILTIAZEM HYDROCHLORIDE 30 MG/1
60 TABLET, FILM COATED ORAL
Status: DISCONTINUED | OUTPATIENT
Start: 2021-04-17 | End: 2021-04-17

## 2021-04-17 RX ORDER — POLYETHYLENE GLYCOL 3350 17 G/17G
17 POWDER, FOR SOLUTION ORAL DAILY PRN
Status: DISCONTINUED | OUTPATIENT
Start: 2021-04-17 | End: 2021-04-19 | Stop reason: HOSPADM

## 2021-04-17 RX ADMIN — POTASSIUM CHLORIDE 20 MEQ: 1500 TABLET, EXTENDED RELEASE ORAL at 17:06

## 2021-04-17 RX ADMIN — MONTELUKAST 10 MG: 10 TABLET, FILM COATED ORAL at 22:26

## 2021-04-17 RX ADMIN — Medication 10 ML: at 14:50

## 2021-04-17 RX ADMIN — POTASSIUM CHLORIDE 40 MEQ: 1500 TABLET, EXTENDED RELEASE ORAL at 12:12

## 2021-04-17 RX ADMIN — ATORVASTATIN CALCIUM 10 MG: 10 TABLET, FILM COATED ORAL at 22:26

## 2021-04-17 RX ADMIN — FUROSEMIDE 20 MG: 10 INJECTION, SOLUTION INTRAMUSCULAR; INTRAVENOUS at 12:13

## 2021-04-17 RX ADMIN — DILTIAZEM HYDROCHLORIDE 10 MG: 5 INJECTION INTRAVENOUS at 14:49

## 2021-04-17 RX ADMIN — DILTIAZEM HYDROCHLORIDE 60 MG: 30 TABLET, FILM COATED ORAL at 17:07

## 2021-04-17 RX ADMIN — ENOXAPARIN SODIUM 50 MG: 60 INJECTION SUBCUTANEOUS at 14:47

## 2021-04-17 RX ADMIN — FUROSEMIDE 20 MG: 10 INJECTION, SOLUTION INTRAMUSCULAR; INTRAVENOUS at 17:07

## 2021-04-17 RX ADMIN — DILTIAZEM HYDROCHLORIDE 60 MG: 30 TABLET, FILM COATED ORAL at 22:25

## 2021-04-17 RX ADMIN — Medication 10 ML: at 22:27

## 2021-04-17 NOTE — ED NOTES
Dilt drip orders clarified with Dr. Rusty Jimenez, no drip at this time, reach out to hospitalist if HR becomes tachycardic, give oral dose now.

## 2021-04-17 NOTE — ED NOTES
Pts cousin Abhishek Rodriguez called and wanted an update. Obtained verbal consent from pt to give her cousin an update. Per cousin, pt has hx of parkinsons and dementia and had a procedure 7 years ago on her heart for afib that \"shocked different parts back into rhythm\". Cousin does not know name of procedure. Cousin updated on pts status.     Cousin Name: Abhishek Rodriguez  Phone Number: [phone]

## 2021-04-17 NOTE — ED PROVIDER NOTES
EMERGENCY DEPARTMENT HISTORY AND PHYSICAL EXAM      Date: 4/17/2021  Patient Name: Nik Sewell    History of Presenting Illness     Chief Complaint   Patient presents with    Shortness of Breath     pt [facility] \"not feeling well and SOB. \" [facility] states pt was 88% on RA and in AFIB on KEG. pt states she has a possibly hx of afib and possibly COPD. pt has expiratory wheezing and SOB with exertion/activity         HPI: Nik Sewell, 80 y.o. female presents to the ED with cc of shortness of breath. She states that she has been feeling weaker than usual over the past 1 week. She reports an associated dry cough, and mild shortness of breath. [facility] at [facility] and found to be hypoxic. She denies any chest pain, no nausea or diaphoresis, denies any fevers. She has no known sick contacts. She does note that over the past 3 weeks she has had loss of sense of taste. No abdominal pain, vomiting, diarrhea, dysuria or hematuria. She had her second Covid vaccination on March 25. There are no other complaints, changes, or physical findings at this time. PCP: Janella Epley, MD    No current facility-administered medications on file prior to encounter. Current Outpatient Medications on File Prior to Encounter   Medication Sig Dispense Refill    LORazepam (ATIVAN) 0.5 mg tablet Take  by mouth.  traMADol (ULTRAM) 50 mg tablet Take 50 mg by mouth every six (6) hours as needed for Pain.  tetracycline (ACHROMYCIN, SUMYCIN) 500 mg capsule Take  by mouth Before breakfast, lunch, dinner and at bedtime.       montelukast (SINGULAIR) 10 mg tablet TAKE ONE TABLET BY MOUTH ONE TIME DAILY  30 Tab 2    fluticasone (FLONASE) 50 mcg/actuation nasal spray USE TWO SPRAYS IN EACH NOSTRIL DAILY  48 g 5    VENTOLIN HFA 90 mcg/actuation inhaler Inhale two puffs by mouth every 4 to 6 hours as needed shortness of breath 1 Inhaler 0    traZODone (DESYREL) 50 mg tablet TAKE ONE TABLET BY MOUTH NIGHTLY AT BEDTIME  30 Tab 5    lovastatin (MEVACOR) 20 mg tablet TAKE ONE TABLET BY MOUTH NIGHTLY AT BEDTIME  90 Tab 1    levothyroxine (SYNTHROID) 88 mcg tablet TAKE ONE TABLET BY MOUTH EVERY MORNING BEFORE BREAKFAST (Patient taking differently: 100 mcg. TAKE ONE TABLET BY MOUTH EVERY MORNING BEFORE BREAKFAST) 90 tablet 2    diclofenac (VOLTAREN) 1 % topical gel Apply 4 g to affected area four (4) times daily. 100 g 1    clonazePAM (KLONOPIN) 0.5 mg tablet Take 1 tablet by mouth nightly as needed. Indications: sleep, stress 90 tablet 0    calcium-cholecalciferol, d3, (CALCIUM 600 + D) 600-125 mg-unit tab Take  by mouth. Takes 1/2 pill daily      aspirin delayed-release 81 mg tablet Take 81 mg by mouth daily. 1 tab Taking 3 times a week      acetaminophen (TYLENOL EXTRA STRENGTH) 500 mg tablet Take 500 mg by mouth every six (6) hours as needed. Indications: ARTHRITIC PAIN      magnesium hydroxide (MILK OF MAGNESIA) 400 mg/5 mL suspension Take 30 mL by mouth daily as needed.  omega-3 fatty acids-vitamin e (FISH OIL) 1,000 mg Cap Take 1,000 mg by mouth daily. Past History     Past Medical History:  Past Medical History:   Diagnosis Date    Allergy, unspecified not elsewhere classified     Anxiety state, unspecified     Breast calcification seen on mammogram 2011    bilaterally.  Bursitis of hip     with Tendonitis. Dr. Jyoti Norwood. Dr. Omkar Ryan. Dr. Ankit Siddiqui.  Cataract 2013    bilaterally. Dr. Sinai Montoya.  COPD     Dry eye syndrome     Dr. Horacio Gaucher Gallstone 2002    GERD (gastroesophageal reflux disease)     H. pylori positive Txd. Dr. Tho Dill    Heart murmur 12/2011    Moderate MR. Moderate TR. Moderate AoR. Normal Stress Test.  Dr. Kirstin Paredes.     Hemorrhoids, internal 06/2003    Dr. Shant Gonzalez Hiatal hernia     Dr. Shant Gonzalez Hyperthyroidism 1980s    Txd with APARICIO    IBS (irritable bowel syndrome) 1958    Intractable ophthalmic migraine     Lactose intolerance     Lyme disease 09/2011    Txd with Doxy x 21. Dr. Yaz Strange Osteopenia 12/2002, 09/2004,8/2012    Dr. Rahat Martinez.  Other and unspecified hyperlipidemia     Paroxysmal SVT (supraventricular tachycardia) ([facility] 09/01/14    Dr. Cherylene Sails. Dr. Clem Butler.    Polyuria 2013    Psoriasis 08/2011    Left ear. Dr. Tiffany Tapia RBBB (right bundle branch block) 12/2011    Dr. Cherylene Sails. Normal Stress Test 12/2011.  SBO (small bowel obstruction) ([facility] [address] 1970s    Unspecified hypothyroidism 1980s    Valvular heart disease 2014    mild-mod MR.  TRBritton  AI. Dr. Ty Rivas D deficiency 06/2011       Past Surgical History:  Past Surgical History:   Procedure Laterality Date    COLONOSCOPY  06/2003    Dr. Namrata Doe. due q 10 yrs    HX BREAST BIOPSY  2011    Right. [facility]. [facility].  HX CATARACT REMOVAL Bilateral 10/15/2013 (Right), 11/06/2013 (Left)    Dr. Valery Murillo  [id number]    Dr. Ciro Brody MD    HX LAP CHOLECYSTECTOMY      HX ORTHOPAEDIC      some type of foot surgery 30 yrs ago    HX ARCELIA AND BSO  1973    due to fibroids then SBO    HX TONSILLECTOMY  1956    [address] FLX W/RMVL OF TUMOR POLYP LESION SNARE TQ  3/14/2013         NE COLSC FLX W/RMVL OF TUMOR POLYP LESION SNARE TQ  3/14/2013         NE LAP,CHOLECYSTECTOMY  2002    Dr. Nguyen Mann.        Family History:  Family History   Problem Relation Age of Onset    Heart Disease Mother         chf    Hypertension Mother    Josselin Kaplan Arthritis-osteo Mother    Josselin Rosaura Stroke Mother     Thyroid Disease Sister     Diabetes Maternal Grandmother     Kidney Disease Other         tumor present       Social History:  Social History     Tobacco Use    Smoking status: Current Every Day Smoker     Packs/day: 0.30     Years: 50.00     Pack years: 15.00     Types: Cigarettes    Smokeless tobacco: Never Used    Tobacco comment: 2 cigarettes daily   Substance Use Topics    Alcohol use: No    Drug use: No       Allergies: Allergies   Allergen Reactions    Azithromycin Nausea Only     Loss of appetite, jittery    Fosamax [Alendronate] Other (comments)     Ulcers    Zocor [Simvastatin] Myalgia     Able to take 10 mg daily only         Review of Systems   no fever  No eye pain  No ear pain  Reports shortness of breath  no chest pain  no abdominal pain  no dysuria  no leg pain  No rash  No lymphadenopathy  No weight loss    Physical Exam   Physical Exam  Constitutional:       Comments: Mildly tachypneic   HENT:      Mouth/Throat:      Mouth: Mucous membranes are moist.   Eyes:      Extraocular Movements: Extraocular movements intact. Cardiovascular:      Rate and Rhythm: Normal rate. Rhythm irregular. Pulmonary:      Comments: Mildly tachypneic, able to speak in full sentences, no significant accessory muscle usage, coarse breath sounds at bilateral bases  Abdominal:      Palpations: Abdomen is soft. Tenderness: There is no abdominal tenderness. Musculoskeletal:      Right lower leg: She exhibits no tenderness. No edema. Left lower leg: She exhibits no tenderness. No edema. Skin:     General: Skin is warm and dry. Neurological:      General: No focal deficit present. Mental Status: She is alert and oriented to person, place, and time.    Psychiatric:         Mood and Affect: Mood normal.         Diagnostic Study Results     Labs -     Recent Results (from the past 24 hour(s))   EKG, 12 LEAD, INITIAL    Collection Time: 04/17/21 10:00 AM   Result Value Ref Range    Ventricular Rate 97 BPM    Atrial Rate 108 BPM    QRS Duration 118 ms    Q-T Interval 400 ms    QTC Calculation (Bezet) 508 ms    Calculated R Axis 56 degrees    Calculated T Axis 19 degrees    Diagnosis       Atrial fibrillation  Right bundle branch block  When compared with ECG of 01-SEP-2014 20:20,  Atrial fibrillation has replaced Sinus rhythm  T wave inversion now evident in Inferior leads  T wave inversion now evident in Anterior leads     CBC WITH AUTOMATED DIFF    Collection Time: 04/17/21 10:39 AM   Result Value Ref Range    WBC 9.8 3.6 - 11.0 K/uL    RBC 4.23 3.80 - 5.20 M/uL    HGB 12.9 11.5 - 16.0 g/dL    HCT 40.6 35.0 - 47.0 %    MCV 96.0 80.0 - 99.0 FL    MCH 30.5 26.0 - 34.0 PG    MCHC 31.8 30.0 - 36.5 g/dL    RDW 13.6 11.5 - 14.5 %    PLATELET 111 861 - 513 K/uL    MPV 10.1 8.9 - 12.9 FL    NRBC 0.0 0  WBC    ABSOLUTE NRBC 0.00 0.00 - 0.01 K/uL    NEUTROPHILS 67 32 - 75 %    LYMPHOCYTES 23 12 - 49 %    MONOCYTES 8 5 - 13 %    EOSINOPHILS 1 0 - 7 %    BASOPHILS 1 0 - 1 %    IMMATURE GRANULOCYTES 0 0.0 - 0.5 %    ABS. NEUTROPHILS 6.4 1.8 - 8.0 K/UL    ABS. LYMPHOCYTES 2.2 0.8 - 3.5 K/UL    ABS. MONOCYTES 0.8 0.0 - 1.0 K/UL    ABS. EOSINOPHILS 0.1 0.0 - 0.4 K/UL    ABS. BASOPHILS 0.1 0.0 - 0.1 K/UL    ABS. IMM. GRANS. 0.0 0.00 - 0.04 K/UL    DF AUTOMATED     SAMPLES BEING HELD    Collection Time: 04/17/21 10:39 AM   Result Value Ref Range    SAMPLES BEING HELD  1 RD, 1 SST, PST, 1 BLUE     COMMENT        Add-on orders for these samples will be processed based on acceptable specimen integrity and analyte stability, which may vary by analyte.    PROTHROMBIN TIME + INR    Collection Time: 04/17/21 10:39 AM   Result Value Ref Range    INR 1.1 0.9 - 1.1      Prothrombin time 11.6 (H) 9.0 - 11.1 sec   PTT    Collection Time: 04/17/21 10:39 AM   Result Value Ref Range    aPTT 24.5 22.1 - 31.0 sec    aPTT, therapeutic range     58.0 - 77.0 SECS   D DIMER    Collection Time: 04/17/21 10:39 AM   Result Value Ref Range    D-dimer 0.89 (H) 0.00 - 0.65 mg/L FEU   FIBRINOGEN    Collection Time: 04/17/21 10:39 AM   Result Value Ref Range    Fibrinogen 325 200 - 475 mg/dL   SARS-COV-2    Collection Time: 04/17/21 10:42 AM   Result Value Ref Range    SARS-CoV-2 Please find results under separate order         Radiologic Studies -   XR CHEST PORT   Final Result   Small bilateral pleural effusions with underlying atelectasis and   pulmonary edema. CT Results  (Last 48 hours)    None        CXR Results  (Last 48 hours)               04/17/21 1021  XR CHEST PORT Final result    Impression:  Small bilateral pleural effusions with underlying atelectasis and   pulmonary edema. Narrative: Indication: Shortness of breath       Comparison: 9/1/2014       Portable exam of the chest obtained at 1010 demonstrates normal heart size. There are small bilateral pleural effusions with underlying atelectasis and mild   pulmonary edema. The osseous structures are unremarkable. Medical Decision Making   I am the first provider for this patient. I reviewed the vital signs, available nursing notes, past medical history, past surgical history, family history and social history. Vital Signs-Reviewed the patient's vital signs. Patient Vitals for the past 24 hrs:   Temp Pulse Resp BP SpO2   04/17/21 1104  94 24 (!) 155/89 91 %   04/17/21 1001  96 24 (!) 154/84 90 %   04/17/21 0958 97.4 °F (36.3 °C) 100 25 (!) 154/84 (!) 84 %         Provider Notes (Medical Decision Making):   80-year-old female presenting with shortness of breath and generalized weakness. Chest she is hypoxic in room air at 84% on arrival, improved to 91% on 3 L. Concern for CHF, pneumonia, Covid, electrolyte/metabolic abnormalities, atypical ACS, dysrhythmia. ED Course:     Initial assessment performed. The patients presenting problems have been discussed, and they are in agreement with the care plan formulated and outlined with them. I have encouraged them to ask questions as they arise throughout their visit. EKG is performed at 10: 00, shows atrial fibrillation at a rate of 97, , QTc 508, no ST segment elevation or depression concerning for ACS, right bundle branch block is present. This is seen on prior EKGs, however I do not see any prior history of atrial fibrillation. This is interpreted as atrial fibrillation with right bundle branch block. CBC negative for leukocytosis or anemia, chest x-ray shows small bilateral pleural effusions and pulmonary edema. CBC negative for leukocytosis or anemia, basic metabolic panel with slightly elevated creatinine 1.06, hypokalemia 3.3. Troponin is negative, BNP is elevated at 2000. She will be given IV Lasix and oral potassium. She will be Covid tested. On reevaluation, patient is resting comfortably, saturating 93% on 3 L nasal cannula, she will be admitted. Critical Care Time:     CRITICAL CARE NOTE :    2:13 PM    IMPENDING DETERIORATION -Respiratory  ASSOCIATED RISK FACTORS - Hypoxia  MANAGEMENT- Bedside Assessment and Supervision of Care  INTERPRETATION -  Xrays, ECG and Blood Pressure  INTERVENTIONS -oxygen, Lasix  CASE REVIEW - Hospitalist/Intensivist, Nursing and Family  TREATMENT RESPONSE -Stable  PERFORMED BY - Self    NOTES   :  I have spent 35 minutes of critical care time involved in lab review, consultations with specialist, family decision- making, bedside attention and documentation. This time excludes time spent in any separate billed procedures. During this entire length of time I was immediately available to the patient . Jia Flores MD      Disposition:  Admit    PLAN:  1. Current Discharge Medication List        2.    Follow-up Information    None       Return to ED if worse     Diagnosis     Clinical Impression: Acute shortness of breath and hypoxic respiratory failure secondary to pulmonary congestion

## 2021-04-17 NOTE — H&P
Hospitalist Admission Note    NAME: Genny Paulino   :     MRN:  [id number]     Date/Time:  2021 1:59 PM    Patient PCP: Chris Reece MD    Please note that this dictation was completed with [employer], the computer voice recognition software. Quite often unanticipated grammatical, syntax, homophones, and other interpretive errors are inadvertently transcribed by the computer software. Please disregard these errors. Please excuse any errors that have escaped final proofreading  ______________________________________________________________________   Assessment & Plan:  Acute hypoxic respiratory failure, POA due to  Bilateral interstitial infiltrates likely pulmonary edema, POA with new onset diastolic chf, POA EF 35-47% in   Moderate AI and moderate MR on prior echo  New onset afib with intermittent RVR, POA    Prior hx of SVT s/p RF  Uncontrolled HTN, new dx  Hypokalemia, POA 3.3  --84% RA in ER, currently 91-93% on 3L. probnp elevated   --covid pcr test ordered; rapid test negative  --diurese with lasix 20mg IV bid; daily weight, check echo  --diltiazem bolus 10mg now and then start diltiazem 60mg TID; IV labetalol prn SBP >180 or DBP >100. If afib persistently >110, then start diltiazem drip  --lovenox 1mg/kg q12h and switch to eliquis before discharge  --cardiology consult  --kdur 40meq given in er; start kdur 20meq bid  --monitor BMP    Parkinson disease  Acquired hypothyroid -- tsh normal today  Copd not on home O2  Tobacco use -- advised patient to quit smoking.    --need med reconciliation since patient cannot provide PTA meds        Body mass index is 21.95 kg/m².     Code:  DNR, DNI as per patient's wishes upon discussion  DVT prophylaxis: lovenox  Surrogate decision maker:            Subjective:   CHIEF COMPLAINT:  Fatigue, loss of taste, NEGRO, cough x 1 month    HISTORY OF PRESENT ILLNESS:     Genny Paulino is a 80 y.o. female with PMH pSVT s/p RF ablation in 2015, Parkinson's disease, hypothyroidism referred from [facility] for hypoxia O2 sat 88% RA. Patient reports about 1 month of fatigue, NEGRO, nonproductive cough, mild wheezing, lost of taste, smell intact, had covid-19 vaccine second shot 3 weeks ago (March 25). Has decreased appetite, lost few lbs, no swelling, no fever or chills, no chest pain, no bleeding, skin or hair changes, sometimes feel dizzy, lightheaded. Occasional palpitations. We were asked to admit for work up and evaluation of the above problems. Past Medical History:   Diagnosis Date    Allergy, unspecified not elsewhere classified     Anxiety state, unspecified     Breast calcification seen on mammogram 2011    bilaterally.  Bursitis of hip     with Tendonitis. Dr. Ralph Mariscal. Dr. Leyla Thompson. Dr. Christina Malone.  Cataract 2013    bilaterally. Dr. Tom Ramos.  COPD     Dry eye syndrome     Dr. Deyanira Tomlinson Gallstone 2002    GERD (gastroesophageal reflux disease)     H. pylori positive Txd. Dr. Svetlana Rainey    Heart murmur 12/2011    Moderate MR. Moderate TR. Moderate AoR. Normal Stress Test.  Dr. Shane Yang.  Hemorrhoids, internal 06/2003    Dr. Denilson Caballero Hiatal hernia     Dr. Denilson Caballero Hyperthyroidism 1980s    Txd with APARICIO    IBS (irritable bowel syndrome) 1958    Intractable ophthalmic migraine     Lactose intolerance     Lyme disease 09/2011    Txd with Doxy x 21. Dr. Zaldivar Nurse Osteopenia 12/2002, 09/2004,8/2012    Dr. Mandi Belle.  Other and unspecified hyperlipidemia     Paroxysmal SVT (supraventricular tachycardia) ([facility] [address] 09/01/14    Dr. Belkys Dyer. Dr. Deon Tom.    Polyuria 2013    Psoriasis 08/2011    Left ear. Dr. Gladys Meza RBBB (right bundle branch block) 12/2011    Dr. Belkys Dyer. Normal Stress Test 12/2011.     SBO (small bowel obstruction) (HCC) 1970s    Unspecified hypothyroidism 1980s    Valvular heart disease 2014    mild-mod MR.  TRBritton  AI. Dr. Herminia KAUR deficiency 06/2011      Past Surgical History:   Procedure Laterality Date    COLONOSCOPY  06/2003    Dr. Saintclair Lynch. due q 10 yrs    HX BREAST BIOPSY  2011    Right. benign. [facility].  HX CATARACT REMOVAL Bilateral 10/15/2013 (Right), 11/06/2013 (Left)    Dr. Soumya Bbain  [id number]    Dr. Thiago Holliday MD    HX LAP CHOLECYSTECTOMY      HX ORTHOPAEDIC      some type of foot surgery 30 yrs ago    HX ARCELIA AND BSO  1973    due to fibroids then SBO    HX TONSILLECTOMY  1956    [address] FLX W/RMVL OF TUMOR POLYP LESION SNARE TQ  3/14/2013         NE COLSC FLX W/RMVL OF TUMOR POLYP LESION SNARE TQ  3/14/2013         NE LAP,CHOLECYSTECTOMY  2002    Dr. Felton Lin. Social History     Tobacco Use    Smoking status: Current Every Day Smoker     Packs/day: 0.30     Years: 50.00     Pack years: 15.00     Types: Cigarettes    Smokeless tobacco: Never Used    Tobacco comment: 2 cigarettes daily   Substance Use Topics    Alcohol use: No      Drug use:        Family History   Problem Relation Age of Onset    Heart Disease Mother         chf    Hypertension Mother     Arthritis-osteo Mother     Stroke Mother     Thyroid Disease Sister     Diabetes Maternal Grandmother     Kidney Disease Other         tumor present     Allergies   Allergen Reactions    Azithromycin Nausea Only     Loss of appetite, jittery    Fosamax [Alendronate] Other (comments)     Ulcers    Zocor [Simvastatin] Myalgia     Able to take 10 mg daily only        Prior to Admission medications    Medication Sig Start Date End Date Taking? Authorizing Provider   LORazepam (ATIVAN) 0.5 mg tablet Take  by mouth. Provider, Historical   traMADol (ULTRAM) 50 mg tablet Take 50 mg by mouth every six (6) hours as needed for Pain.     Provider, Historical   tetracycline (ACHROMYCIN, SUMYCIN) 500 mg capsule Take  by mouth Before breakfast, lunch, dinner and at bedtime. Provider, Historical   montelukast (SINGULAIR) 10 mg tablet TAKE ONE TABLET BY MOUTH ONE TIME DAILY  5/26/15   Erlindana Cords R, DO   fluticasone Evertt Glatter) 50 mcg/actuation nasal spray USE TWO SPRAYS IN [facility] NOSTRIL DAILY  4/23/15   Erlindana Cords R, DO   VENTOLIN HFA 90 mcg/actuation inhaler Inhale two puffs by mouth every 4 to 6 hours as needed shortness of breath 3/5/15   Erlindana Cords R, DO   traZODone (DESYREL) 50 mg tablet TAKE ONE TABLET BY MOUTH NIGHTLY AT BEDTIME  2/18/15   Erlindana Cords R, DO   lovastatin (MEVACOR) 20 mg tablet TAKE ONE TABLET BY MOUTH NIGHTLY AT BEDTIME  2/11/15   Erlindana Cords R, DO   levothyroxine (SYNTHROID) 88 mcg tablet TAKE ONE TABLET BY MOUTH EVERY MORNING BEFORE BREAKFAST  Patient taking differently: 100 mcg. TAKE ONE TABLET BY MOUTH EVERY MORNING BEFORE BREAKFAST 12/29/14   Ghassan Cords R, DO   diclofenac (VOLTAREN) 1 % topical gel Apply 4 g to affected area four (4) times daily. 12/29/14   Erlindana Cords R, DO   clonazePAM (KLONOPIN) 0.5 mg tablet Take 1 tablet by mouth nightly as needed. Indications: sleep, stress 12/29/14   Ghassan Cords R, DO   calcium-cholecalciferol, d3, (CALCIUM 600 + D) 600-125 mg-unit tab Take  by mouth. Takes 1/2 pill daily 12/15/14   Provider, Historical   aspirin delayed-release 81 mg tablet Take 81 mg by mouth daily. 1 tab Taking 3 times a week    Provider, Historical   acetaminophen (TYLENOL EXTRA STRENGTH) 500 mg tablet Take 500 mg by mouth every six (6) hours as needed. Indications: ARTHRITIC PAIN    Provider, Historical   magnesium hydroxide (MILK OF MAGNESIA) 400 mg/5 mL suspension Take 30 mL by mouth daily as needed. Provider, Historical   omega-3 fatty acids-vitamin e (FISH OIL) 1,000 mg Cap Take 1,000 mg by mouth daily. 4/21/10   Provider, Historical     REVIEW OF SYSTEMS:  POSITIVE= Bold.   Negative = normal text  General:  fever, chills, sweats, generalized weakness, weight loss/gain, loss of appetite  Eyes:  blurred vision, eye pain, loss of vision, diplopia  Ear Nose and Throat:  rhinorrhea, pharyngitis  Respiratory:   cough, sputum production, SOB, wheezing, NEGRO, pleuritic pain  Cardiology:  chest pain, palpitations, orthopnea, PND, edema, syncope   Gastrointestinal:  abdominal pain, N/V, dysphagia, diarrhea, constipation, bleeding  Genitourinary:  frequency, urgency, dysuria, hematuria, incontinence  Muskuloskeletal :  arthralgia, myalgia  Hematology:  easy bruising, bleeding, lymphadenopathy  Dermatological:  rash, ulceration, pruritis  Endocrine:  hot flashes or polydipsia  Neurological:  headache, dizziness, confusion, focal weakness, paresthesia, memory loss, gait disturbance  Psychological: anxiety, depression, agitation      Objective:   VITALS:    Visit Vitals  BP (!) 150/88   Pulse 87   Temp 97.4 °F (36.3 °C)   Resp 17   Ht 5' 2\" (1.575 m)   Wt 54.4 kg (120 lb)   SpO2 93%   BMI 21.95 kg/m²     Temp (24hrs), Av.4 °F (36.3 °C), Min:97.4 °F (36.3 °C), Max:97.4 °F (36.3 °C)    Body mass index is 21.95 kg/m². PHYSICAL EXAM:    General:    Alert, petite female, cooperative, no distress, appears stated age. Poor historian and memory recall  HEENT: Atraumatic, anicteric sclerae, pink conjunctivae     No oral ulcers, mucosa moist, throat clear. Hearing intact. Neck:  Supple, symmetrical,  thyroid: non tender  Lungs:   Mild b/l crackles. No Wheezing or Rhonchi. Chest wall:  No tenderness  No Accessory muscle use. Heart:   Irregular  rhythm,  2/6 systolic murmur   No gallop. 1+ pitting edema lower legs. On tele HR mostly  but goes up to 113 occasionally. Abdomen:   Soft, non-tender. Not distended. Bowel sounds normal. No masses  Extremities: No cyanosis. No clubbing  Skin:     Not pale Not Jaundiced  No rashes   Psych:  Fair insight. Not depressed. Not anxious or agitated. Neurologic: EOMs intact. No facial asymmetry.  No aphasia or slurred speech. Symmetrical strength, Alert and oriented X self, place, month/year. Difficulty with president recall     IMAGING RESULTS:   [x]       I have personally reviewed the actual   [x]     CXR  []     CT scan  CXR:    Impression:    Small bilateral pleural effusions with underlying atelectasis and   pulmonary edema. Narrative: Indication: Shortness of breath     Comparison: 9/1/2014     Portable exam of the chest obtained at 1010 demonstrates normal heart size. There are small bilateral pleural effusions with underlying atelectasis and mild   pulmonary edema. The osseous structures are unremarkable. CT :  EKG:   ________________________________________________________________________  Care Plan discussed with:    Comments   Patient y    [facility]:      ________________________________________________________________________  Prophylaxis:  GI none   DVT lovenox   ________________________________________________________________________  Recommended Disposition:   Home with Family y   HH/PT/OT/RN y   SNF/LTC    [facility]    ________________________________________________________________________  Code Status:  Full Code    DNR/DNI y   ________________________________________________________________________  TOTAL TIME:  45 minutes  ______________________________________________________________________  Soraya العراقي MD      Procedures: see electronic medical records for all procedures/Xrays and details which were not copied into this note but were reviewed prior to creation of Plan.     LAB DATA REVIEWED:    Recent Results (from the past 24 hour(s))   EKG, 12 LEAD, INITIAL    Collection Time: 04/17/21 10:00 AM   Result Value Ref Range    Ventricular Rate 97 BPM    Atrial Rate 108 BPM    QRS Duration 118 ms    Q-T Interval 400 ms    QTC Calculation (Bezet) 508 ms    Calculated R Axis 56 degrees    Calculated T Axis 19 degrees    Diagnosis Atrial fibrillation  Right bundle branch block  When compared with ECG of 01-SEP-2014 20:20,  Atrial fibrillation has replaced Sinus rhythm  T wave inversion now evident in Inferior leads  T wave inversion now evident in Anterior leads     CBC WITH AUTOMATED DIFF    Collection Time: 04/17/21 10:39 AM   Result Value Ref Range    WBC 9.8 3.6 - 11.0 K/uL    RBC 4.23 3.80 - 5.20 M/uL    HGB 12.9 11.5 - 16.0 g/dL    HCT 40.6 35.0 - 47.0 %    MCV 96.0 80.0 - 99.0 FL    MCH 30.5 26.0 - 34.0 PG    MCHC 31.8 30.0 - 36.5 g/dL    RDW 13.6 11.5 - 14.5 %    PLATELET 035 832 - 296 K/uL    MPV 10.1 8.9 - 12.9 FL    NRBC 0.0 0  WBC    ABSOLUTE NRBC 0.00 0.00 - 0.01 K/uL    NEUTROPHILS 67 32 - 75 %    LYMPHOCYTES 23 12 - 49 %    MONOCYTES 8 5 - 13 %    EOSINOPHILS 1 0 - 7 %    BASOPHILS 1 0 - 1 %    IMMATURE GRANULOCYTES 0 0.0 - 0.5 %    ABS. NEUTROPHILS 6.4 1.8 - 8.0 K/UL    ABS. LYMPHOCYTES 2.2 0.8 - 3.5 K/UL    ABS. MONOCYTES 0.8 0.0 - 1.0 K/UL    ABS. EOSINOPHILS 0.1 0.0 - 0.4 K/UL    ABS. BASOPHILS 0.1 0.0 - 0.1 K/UL    ABS. IMM. GRANS. 0.0 0.00 - 0.04 K/UL    DF AUTOMATED     METABOLIC PANEL, COMPREHENSIVE    Collection Time: 04/17/21 10:39 AM   Result Value Ref Range    Sodium 144 136 - 145 mmol/L    Potassium 3.3 (L) 3.5 - 5.1 mmol/L    Chloride 109 (H) 97 - 108 mmol/L    CO2 30 21 - 32 mmol/L    Anion gap 5 5 - 15 mmol/L    Glucose 112 (H) 65 - 100 mg/dL    BUN 19 6 - 20 MG/DL    Creatinine 1.06 (H) 0.55 - 1.02 MG/DL    BUN/Creatinine ratio 18 12 - 20      GFR est AA >60 >60 ml/min/1.73m2    GFR est non-AA 50 (L) >60 ml/min/1.73m2    Calcium 8.6 8.5 - 10.1 MG/DL    Bilirubin, total 0.8 0.2 - 1.0 MG/DL    ALT (SGPT) 20 12 - 78 U/L    AST (SGOT) 36 15 - 37 U/L    Alk.  phosphatase 102 45 - 117 U/L    Protein, total 7.4 6.4 - 8.2 g/dL    Albumin 3.1 (L) 3.5 - 5.0 g/dL    Globulin 4.3 (H) 2.0 - 4.0 g/dL    A-G Ratio 0.7 (L) 1.1 - 2.2     CK W/ REFLX CKMB    Collection Time: 04/17/21 10:39 AM   Result Value Ref Range  26 - 192 U/L   TROPONIN I    Collection Time: 04/17/21 10:39 AM   Result Value Ref Range    Troponin-I, Qt. <0.05 <0.05 ng/mL   SAMPLES BEING HELD    Collection Time: 04/17/21 10:39 AM   Result Value Ref Range    SAMPLES BEING HELD  1 RD, 1 SST, PST, 1 BLUE     COMMENT        Add-on orders for these samples will be processed based on acceptable specimen integrity and analyte stability, which may vary by analyte.    NT-PRO BNP    Collection Time: 04/17/21 10:39 AM   Result Value Ref Range    NT pro-BNP 2,017 (H) <450 PG/ML   PROTHROMBIN TIME + INR    Collection Time: 04/17/21 10:39 AM   Result Value Ref Range    INR 1.1 0.9 - 1.1      Prothrombin time 11.6 (H) 9.0 - 11.1 sec   PTT    Collection Time: 04/17/21 10:39 AM   Result Value Ref Range    aPTT 24.5 22.1 - 31.0 sec    aPTT, therapeutic range     58.0 - 77.0 SECS   PROCALCITONIN    Collection Time: 04/17/21 10:39 AM   Result Value Ref Range    Procalcitonin <0.05 ng/mL   FERRITIN    Collection Time: 04/17/21 10:39 AM   Result Value Ref Range    Ferritin 26 8 - 252 NG/ML   D DIMER    Collection Time: 04/17/21 10:39 AM   Result Value Ref Range    D-dimer 0.89 (H) 0.00 - 0.65 mg/L FEU   LD    Collection Time: 04/17/21 10:39 AM   Result Value Ref Range     (H) 81 - 246 U/L   MAGNESIUM    Collection Time: 04/17/21 10:39 AM   Result Value Ref Range    Magnesium 2.4 1.6 - 2.4 mg/dL   PHOSPHORUS    Collection Time: 04/17/21 10:39 AM   Result Value Ref Range    Phosphorus 3.6 2.6 - 4.7 MG/DL   TSH 3RD GENERATION    Collection Time: 04/17/21 10:39 AM   Result Value Ref Range    TSH 2.44 0.36 - 3.74 uIU/mL   C REACTIVE PROTEIN, QT    Collection Time: 04/17/21 10:39 AM   Result Value Ref Range    C-Reactive protein 1.10 (H) 0.00 - 0.60 mg/dL   FIBRINOGEN    Collection Time: 04/17/21 10:39 AM   Result Value Ref Range    Fibrinogen 325 200 - 475 mg/dL   LACTIC ACID    Collection Time: 04/17/21 10:42 AM   Result Value Ref Range    Lactic acid 1.0 0.4 - 2.0 MMOL/L   SARS-COV-2    Collection Time: 04/17/21 10:42 AM   Result Value Ref Range    SARS-CoV-2 Please find results under separate order     COVID-19 RAPID TEST    Collection Time: 04/17/21 10:42 AM   Result Value Ref Range    Specimen source Nasopharyngeal      COVID-19 rapid test Not detected NOTD

## 2021-04-18 ENCOUNTER — APPOINTMENT (OUTPATIENT)
Dept: NON INVASIVE DIAGNOSTICS | Age: 81
DRG: 308 | End: 2021-04-18
Attending: HOSPITALIST
Payer: MEDICARE

## 2021-04-18 LAB
ANION GAP SERPL CALC-SCNC: 1 MMOL/L (ref 5–15)
ATRIAL RATE: 108 BPM
AV R PG: 90.93 MMHG
BUN SERPL-MCNC: 15 MG/DL (ref 6–20)
BUN/CREAT SERPL: 17 (ref 12–20)
CALCIUM SERPL-MCNC: 8.7 MG/DL (ref 8.5–10.1)
CALCULATED R AXIS, ECG10: 56 DEGREES
CALCULATED T AXIS, ECG11: 19 DEGREES
CHLORIDE SERPL-SCNC: 110 MMOL/L (ref 97–108)
CO2 SERPL-SCNC: 33 MMOL/L (ref 21–32)
CREAT SERPL-MCNC: 0.89 MG/DL (ref 0.55–1.02)
DIAGNOSIS, 93000: NORMAL
ECHO AO ASC DIAM: 3.11 CM
ECHO AO ROOT DIAM: 3.09 CM
ECHO AR MAX VEL PISA: 476.78 CM/S
ECHO AV AREA PEAK VELOCITY: 1.91 CM2
ECHO AV AREA VTI: 1.82 CM2
ECHO AV AREA/BSA PEAK VELOCITY: 1.2 CM2/M2
ECHO AV AREA/BSA VTI: 1.2 CM2/M2
ECHO AV MEAN GRADIENT: 4.43 MMHG
ECHO AV PEAK GRADIENT: 9.11 MMHG
ECHO AV PEAK VELOCITY: 150.87 CM/S
ECHO AV REGURGITANT PHT: 374.18 MS
ECHO AV VTI: 26.34 CM
ECHO LA MAJOR AXIS: 3.31 CM
ECHO LA MINOR AXIS: 2.15 CM
ECHO LV E' LATERAL VELOCITY: 7.7 CM/S
ECHO LV INTERNAL DIMENSION DIASTOLIC: 4.57 CM (ref 3.9–5.3)
ECHO LV INTERNAL DIMENSION SYSTOLIC: 3.85 CM
ECHO LV IVSD: 1.01 CM (ref 0.6–0.9)
ECHO LV MASS 2D: 145.5 G (ref 67–162)
ECHO LV MASS INDEX 2D: 94.6 G/M2 (ref 43–95)
ECHO LV POSTERIOR WALL DIASTOLIC: 0.88 CM (ref 0.6–0.9)
ECHO LVOT CARDIAC OUTPUT: 3.34 LITER/MINUTE
ECHO LVOT DIAM: 1.9 CM
ECHO LVOT PEAK GRADIENT: 4.1 MMHG
ECHO LVOT PEAK VELOCITY: 101.21 CM/S
ECHO LVOT SV: 47.8 ML
ECHO LVOT VTI: 16.83 CM
ECHO MV A VELOCITY: 58.43 CM/S
ECHO MV AREA PHT: 4.34 CM2
ECHO MV AREA VTI: 1.73 CM2
ECHO MV E DECELERATION TIME (DT): 174.6 MS
ECHO MV E VELOCITY: 121.17 CM/S
ECHO MV E/A RATIO: 2.07
ECHO MV E/E' LATERAL: 15.74
ECHO MV EROA PISA: 0.38 CM2
ECHO MV MAX VELOCITY: 151.68 CM/S
ECHO MV MEAN GRADIENT: 2.93 MMHG
ECHO MV PEAK GRADIENT: 9.2 MMHG
ECHO MV PRESSURE HALF TIME (PHT): 50.65 MS
ECHO MV REGURGITANT RADIUS PISA: 0.98 CM
ECHO MV REGURGITANT VOLUME: 78.19 ML
ECHO MV REGURGITANT VTIA: 203.36 CM
ECHO MV VTI: 27.72 CM
ECHO PV MAX VELOCITY: 68.08 CM/S
ECHO PV PEAK INSTANTANEOUS GRADIENT SYSTOLIC: 1.85 MMHG
ECHO TV REGURGITANT MAX VELOCITY: 283.45 CM/S
ECHO TV REGURGITANT MAX VELOCITY: 645.87 CM/S
ECHO TV REGURGITANT PEAK GRADIENT: 32.39 MMHG
ERYTHROCYTE [DISTWIDTH] IN BLOOD BY AUTOMATED COUNT: 13.4 % (ref 11.5–14.5)
GLUCOSE SERPL-MCNC: 92 MG/DL (ref 65–100)
HCT VFR BLD AUTO: 37.3 % (ref 35–47)
HGB BLD-MCNC: 11.8 G/DL (ref 11.5–16)
LVOT MG: 1.63 MMHG
MAGNESIUM SERPL-MCNC: 2.1 MG/DL (ref 1.6–2.4)
MCH RBC QN AUTO: 30.4 PG (ref 26–34)
MCHC RBC AUTO-ENTMCNC: 31.6 G/DL (ref 30–36.5)
MCV RBC AUTO: 96.1 FL (ref 80–99)
MR PISA PV: 674.2 CM/S
NRBC # BLD: 0 K/UL (ref 0–0.01)
NRBC BLD-RTO: 0 PER 100 WBC
PLATELET # BLD AUTO: 234 K/UL (ref 150–400)
PMV BLD AUTO: 9.9 FL (ref 8.9–12.9)
POTASSIUM SERPL-SCNC: 3.5 MMOL/L (ref 3.5–5.1)
Q-T INTERVAL, ECG07: 400 MS
QRS DURATION, ECG06: 118 MS
QTC CALCULATION (BEZET), ECG08: 508 MS
RBC # BLD AUTO: 3.88 M/UL (ref 3.8–5.2)
SARS-COV-2, COV2: NOT DETECTED
SODIUM SERPL-SCNC: 144 MMOL/L (ref 136–145)
SPECIMEN SOURCE, FCOV2M: NORMAL
VENTRICULAR RATE, ECG03: 97 BPM
WBC # BLD AUTO: 9.3 K/UL (ref 3.6–11)

## 2021-04-18 PROCEDURE — 83735 ASSAY OF MAGNESIUM: CPT

## 2021-04-18 PROCEDURE — 74011250636 HC RX REV CODE- 250/636: Performed by: HOSPITALIST

## 2021-04-18 PROCEDURE — 93306 TTE W/DOPPLER COMPLETE: CPT

## 2021-04-18 PROCEDURE — 65270000029 HC RM PRIVATE

## 2021-04-18 PROCEDURE — 85027 COMPLETE CBC AUTOMATED: CPT

## 2021-04-18 PROCEDURE — 36415 COLL VENOUS BLD VENIPUNCTURE: CPT

## 2021-04-18 PROCEDURE — 80048 BASIC METABOLIC PNL TOTAL CA: CPT

## 2021-04-18 PROCEDURE — 99223 1ST HOSP IP/OBS HIGH 75: CPT | Performed by: INTERNAL MEDICINE

## 2021-04-18 PROCEDURE — 99223 1ST HOSP IP/OBS HIGH 75: CPT | Performed by: PSYCHIATRY & NEUROLOGY

## 2021-04-18 PROCEDURE — 74011250637 HC RX REV CODE- 250/637: Performed by: HOSPITALIST

## 2021-04-18 PROCEDURE — 74011250637 HC RX REV CODE- 250/637: Performed by: INTERNAL MEDICINE

## 2021-04-18 PROCEDURE — 74011250637 HC RX REV CODE- 250/637: Performed by: NURSE PRACTITIONER

## 2021-04-18 RX ORDER — LANOLIN ALCOHOL/MO/W.PET/CERES
3 CREAM (GRAM) TOPICAL ONCE
Status: COMPLETED | OUTPATIENT
Start: 2021-04-18 | End: 2021-04-18

## 2021-04-18 RX ORDER — DILTIAZEM HYDROCHLORIDE 120 MG/1
120 CAPSULE, COATED, EXTENDED RELEASE ORAL DAILY
Status: DISCONTINUED | OUTPATIENT
Start: 2021-04-18 | End: 2021-04-19 | Stop reason: HOSPADM

## 2021-04-18 RX ORDER — METOPROLOL TARTRATE 25 MG/1
12.5 TABLET, FILM COATED ORAL EVERY 12 HOURS
Status: DISCONTINUED | OUTPATIENT
Start: 2021-04-18 | End: 2021-04-19 | Stop reason: HOSPADM

## 2021-04-18 RX ADMIN — FUROSEMIDE 20 MG: 10 INJECTION, SOLUTION INTRAMUSCULAR; INTRAVENOUS at 09:57

## 2021-04-18 RX ADMIN — LEVOTHYROXINE SODIUM 100 MCG: 0.1 TABLET ORAL at 05:56

## 2021-04-18 RX ADMIN — Medication 10 ML: at 21:35

## 2021-04-18 RX ADMIN — FUROSEMIDE 20 MG: 10 INJECTION, SOLUTION INTRAMUSCULAR; INTRAVENOUS at 17:46

## 2021-04-18 RX ADMIN — DILTIAZEM HYDROCHLORIDE 120 MG: 120 CAPSULE, COATED, EXTENDED RELEASE ORAL at 12:48

## 2021-04-18 RX ADMIN — ENOXAPARIN SODIUM 50 MG: 60 INJECTION SUBCUTANEOUS at 03:15

## 2021-04-18 RX ADMIN — MONTELUKAST 10 MG: 10 TABLET, FILM COATED ORAL at 21:34

## 2021-04-18 RX ADMIN — Medication 3 MG: at 23:19

## 2021-04-18 RX ADMIN — ATORVASTATIN CALCIUM 10 MG: 10 TABLET, FILM COATED ORAL at 21:34

## 2021-04-18 RX ADMIN — POTASSIUM CHLORIDE 20 MEQ: 1500 TABLET, EXTENDED RELEASE ORAL at 17:46

## 2021-04-18 RX ADMIN — Medication 10 ML: at 05:56

## 2021-04-18 RX ADMIN — ENOXAPARIN SODIUM 50 MG: 60 INJECTION SUBCUTANEOUS at 17:47

## 2021-04-18 RX ADMIN — METOPROLOL TARTRATE 12.5 MG: 25 TABLET, FILM COATED ORAL at 12:48

## 2021-04-18 RX ADMIN — Medication 10 ML: at 17:50

## 2021-04-18 RX ADMIN — POTASSIUM CHLORIDE 20 MEQ: 1500 TABLET, EXTENDED RELEASE ORAL at 09:57

## 2021-04-18 RX ADMIN — DILTIAZEM HYDROCHLORIDE 60 MG: 30 TABLET, FILM COATED ORAL at 09:57

## 2021-04-18 RX ADMIN — METOPROLOL TARTRATE 12.5 MG: 25 TABLET, FILM COATED ORAL at 21:34

## 2021-04-18 NOTE — CONSULTS
Neurology Note    Patient ID:  Alber Miranda  [id number]  66 y.o.  1940      Date of Consultation:  April 18, 2021    Referring Physician: Dr. Michael Wells    Reason for Consultation:  Parkinson's disease    Subjective: I have Parkinson's disease       History of Present Illness:   Alber Miranda is a 80 y.o. female with a history of Parkinson's disease, dementia, hypothyroidism who had developed a 1 month history of fatigue, shortness of breath, nonproductive cough which continued to progress. She has been found to be in new onset A. fib with intermittent RVR and uncontrolled hypertension. She has been followed by cardiology during hospitalization  She is being medically managed for her atrial fibrillation. Neurology was consulted to optimize treatment management of her Parkinson's disease. I spoke with the patient and her cousin who attends all of her doctors appointments. Her cousin was on the phone during the visit. The patient does follow closely with Dr. Fernando Rao, her primary neurologist.  She has been diagnosed with Parkinson's disease suggestive of her 1 year and has been taking rytary for her Parkinson's disease. The cousin is not certain of the exact dosing but will be able to obtain that for us. She was on regular carbidopa/levodopa and just recently switched to [address]. She also does have a dementia and takes Aricept at baseline. She was also being treated for a depression and was on sertraline. The cousin is concerned that she may have not been taking all of her medicines accurately and that her  may not have been able to help coordinate medications. This is why the cousin goes to most of the doctor visits. Her next follow-up appointment with her outpatient neurologist is in 1 month. Past Medical History:   Diagnosis Date    Allergy, unspecified not elsewhere classified     Anxiety state, unspecified     Breast calcification seen on mammogram 2011    bilaterally.  Bursitis of hip     with Tendonitis. Dr. Marcelino Crump. Dr. Jim Rao. Dr. Vicki Urbina.  Cataract 2013    bilaterally. Dr. Valerie Bryant.  COPD     Dry eye syndrome     Dr. Markell Reynoso 2002    GERD (gastroesophageal reflux disease)     H. pylori positive Txd. Dr. Namrata Doe    Heart murmur 12/2011    Moderate MR. Moderate TR. Moderate AoR. Normal Stress Test.  Dr. Amauri Bhagat.  Hemorrhoids, internal 06/2003    Dr. Maximiliano Melendez Hiatal hernia     Dr. Maximiliano Melendez Hyperthyroidism 1980s    Txd with APARICIO    IBS (irritable bowel syndrome) 1958    Intractable ophthalmic migraine     Lactose intolerance     Lyme disease 09/2011    Txd with Doxy x 21. Dr. Yaz Strange Osteopenia 12/2002, 09/2004,8/2012    Dr. Rahat Martinez.  Other and unspecified hyperlipidemia     Parkinson disease ([address]     Paroxysmal SVT (supraventricular tachycardia) ([address] 09/01/14    Dr. Cherylene Sails. Dr. Clem Butler.    Polyuria 2013    Psoriasis 08/2011    Left ear. Dr. Tiffany Tapia RBBB (right bundle branch block) 12/2011    Dr. Cherylene Sails. Normal Stress Test 12/2011.  SBO (small bowel obstruction) ([address] 1970s    Unspecified hypothyroidism 1980s    Valvular heart disease 2014    mild-mod MR.  TR.  AI. Dr. Ty Rivas D deficiency 06/2011        Past Surgical History:   Procedure Laterality Date    COLONOSCOPY  06/2003    Dr. Namrata Doe. due q 10 yrs    HX BREAST BIOPSY  2011    Right. [facility]. [facility].     HX CATARACT REMOVAL Bilateral 10/15/2013 (Right), 11/06/2013 (Left)    Dr. Valery Murillo  [id number]    Dr. Ciro Brody MD    HX LAP CHOLECYSTECTOMY      HX ORTHOPAEDIC      some type of foot surgery 30 yrs ago    HX ARCELIA AND BSO  1973    due to fibroids then SBO    HX TONSILLECTOMY  1956    IA APPENDECTOMY  1957    IA COLSC FLX W/RMVL OF TUMOR POLYP LESION SNARE TQ  3/14/2013         IA COLSC FLX W/RMVL OF TUMOR POLYP LESION SNARE TQ  3/14/2013         UT LAP,CHOLECYSTECTOMY  2002    Dr. Fela Holloway.         Family History   Problem Relation Age of Onset    Heart Disease Mother         chf    Hypertension Mother    [employer] Arthritis-osteo Mother     Stroke Mother     Thyroid Disease Sister     Diabetes Maternal Grandmother     Kidney Disease Other         tumor present        Social History     Tobacco Use    Smoking status: Current Every Day Smoker     Packs/day: 0.30     Years: 50.00     Pack years: 15.00     Types: Cigarettes    Smokeless tobacco: Never Used    Tobacco comment: 2 cigarettes daily   Substance Use Topics    Alcohol use: No        Allergies   Allergen Reactions    Azithromycin Nausea Only     Loss of appetite, jittery    Fosamax [Alendronate] Other (comments)     Ulcers    Zocor [Simvastatin] Myalgia     Able to take 10 mg daily only          Current Facility-Administered Medications   Medication Dose Route Frequency    metoprolol tartrate (LOPRESSOR) tablet 12.5 mg  12.5 mg Oral Q12H    dilTIAZem ER (CARDIZEM CD) capsule 120 mg  120 mg Oral DAILY    sodium chloride (NS) flush 5-40 mL  5-40 mL IntraVENous Q8H    sodium chloride (NS) flush 5-40 mL  5-40 mL IntraVENous PRN    acetaminophen (TYLENOL) tablet 650 mg  650 mg Oral Q6H PRN    Or    acetaminophen (TYLENOL) suppository 650 mg  650 mg Rectal Q6H PRN    polyethylene glycol (MIRALAX) packet 17 g  17 g Oral DAILY PRN    promethazine (PHENERGAN) tablet 12.5 mg  12.5 mg Oral Q6H PRN    Or    ondansetron (ZOFRAN) injection 4 mg  4 mg IntraVENous Q6H PRN    enoxaparin (LOVENOX) injection 50 mg  1 mg/kg SubCUTAneous Q12H    potassium chloride (K-DUR, KLOR-CON) SR tablet 20 mEq  20 mEq Oral BID    levothyroxine (SYNTHROID) tablet 100 mcg  100 mcg Oral 6am    atorvastatin (LIPITOR) tablet 10 mg  10 mg Oral QHS    montelukast (SINGULAIR) tablet 10 mg  10 mg Oral QHS    labetaloL (NORMODYNE;TRANDATE) injection 10 mg  10 mg IntraVENous Q4H PRN    furosemide (LASIX) injection 20 mg  20 mg IntraVENous BID       Review of Systems:    General, constitutional: negative  Eyes, vision: negative  Ears, nose, throat: negative  Cardiovascular, heart: negative  Respiratory: negative  Gastrointestinal: negative  Genitourinary: negative  Musculoskeletal: negative  Skin and integumentary: negative  Psychiatric: negative  Endocrine: negative  Neurological: negative, except for HPI  Hematologic/lymphatic: negative  Allergy/immunology: negative    Objective:     Visit Vitals  BP (!) 170/81 Comment: Dr. Neisha Stack notified   Pulse 81   Temp 98.1 °F (36.7 °C)   Resp 17   Ht 5' 2\" (1.575 m)   Wt 120 lb (54.4 kg)   SpO2 90%   BMI 21.95 kg/m²       Physical Exam:    General:  appears well nourished in no acute distress  Neck: no carotid bruits  Lungs: clear to auscultation  Heart:  no murmurs, regular rate  Lower extremity: peripheral pulses palpable and no edema  Skin: intact    Neurological exam:    The patient was awake and alert. She did know the correct year. She did know the name of the present. She was able to perform simple calculations. She could perform 1 and two-step commands. She has decreased recent memory and does better with remote memory. She has poor insight into her medical conditions and does not know the names of her medications. Cranial nerves:   II-XII were tested    Perrrla  Fundoscopic examination revealed venous pulsations and no clear abnormalities  Visual fields were full  Eomi, no evidence of nystagmus  Facial sensation:  normal and symmetric  Facial motor: normal and symmetric  Hearing intact  SCM strength intact  Tongue: midline without fasciculations    Motor: Tone normal.  There was no definitive cogwheeling today  Pronator drift was absent    No evidence of fasciculations    Strength testing:   deltoid triceps biceps Wrist ext. Wrist flex. intrinsics Hip flex. Hip ext. Knee ext.   Knee flex Dorsi flex Plantar flex   Right 5 5 5 5 5 5 5 5 5 5 5 5   Left 5 5 5 5 5 5 5 5 5 5 5 5         Sensory:  Upper extremity: intact to pp,  Lower extremity: intact to pp,     Reflexes:    Right Left  Biceps  2 2  Triceps 2 2  Brachiorad. 2 2  Patella  2 2  Achilles 2 2    Plantar response:  flexor bilaterally      Cerebellar testing: There was only a very mild resting tremor apparent, finger/nose and sandra were intact however slow and slightly dysrhythmic involving the right more than the left upper extremity. Labs:     Lab Results   Component Value Date/Time    Sodium 144 04/18/2021 03:25 AM    Potassium 3.5 04/18/2021 03:25 AM    Chloride 110 (H) 04/18/2021 03:25 AM    Glucose 92 04/18/2021 03:25 AM    BUN 15 04/18/2021 03:25 AM    Creatinine 0.89 04/18/2021 03:25 AM    Calcium 8.7 04/18/2021 03:25 AM    WBC 9.3 04/18/2021 03:25 AM    HCT 37.3 04/18/2021 03:25 AM    HGB 11.8 04/18/2021 03:25 AM    PLATELET 496 74/76/6368 03:25 AM       Imaging:    Results from Hospital Encounter encounter on 04/22/19   MRI BRAIN W WO CONT    Narrative EXAM: MRI BRAIN W WO CONT    TECHNIQUE: Sagittal and axial T1-weighted images axial FLAIR, T2-weighted,  diffusion weighted, gradient echo,  precontrast. Multiplanar postcontrast  T1-weighted images. IV CONTRAST:  15 cc Dotarem    INDICATION:  Brain MRI of 6/17/2016 and 6/12/2015., headaches    COMPARISON:  None. FINDINGS:  BRAIN PARENCHYMA:    1. No change in the area of cortical thickening in the left parasagittal  posterior frontal lobe compared to studies dating back to 2015. No enhancement. Findings are most consistent with a developmental abnormality such as cortical  dysplasia. 2. Moderate cerebral white matter signal abnormality most consistent with  chronic small vessel ischemic change. No masses or abnormal enhancement. No  acute infarct. .  INTRACRANIAL HEMORRHAGE: None. CSF SPACES:  Mild prominence of the ventricles and cortical sulci, consistent  with cerebral volume loss no hydrocephalus.  No change in ventricular size. Lesle Gulling BASAL CISTERNS:  Patent. MIDLINE SHIFT: None. VASCULAR SYSTEM:  Normal flow voids. PARANASAL SINUSES AND MASTOID AIR CELLS:  No significant opacification. VISUALIZED ORBITS:  No significant abnormalities. Previous bilateral cataract  surgery. VISUALIZED UPPER CERVICAL SPINE:  No significant abnormalities. SELLA:  No enlargement or  focal abnormality. SKULL BASE:  No significant abnormalities. Cerebellar tonsils in normal  position. CALVARIUM:  Intact. Impression IMPRESSION:  Unchanged appearance since 2015 in the left parasagittal posterior  frontal cortical thickening, which appears to be developmental. No significant  change in the moderate cerebral white matter signal abnormality most consistent  with chronic small vessel ischemic change. Results from [place] encounter on 01/11/13   CT ABD PELV W WO CONT    Narrative **Final Report**       ICD Codes / Adm. Diagnosis: 599.70   / Hematuria, unspecified    Examination:  CT ABD PELVIS W WO CON  - [id number] - Jan 11 2013  8:00AM  Accession No:  [id number]  Reason:  hematuria      REPORT:  CLINICAL HISTORY:  Hematuria    INDICATION:  Hematuria    COMPARISON: 4/15/2006      TECHNIQUE:    Noncontrast enhanced CT of the abdomen and pelvis was performed    Then following the uneventful intravenous administration of Optiray 350, as   well as administration of oral contrast, 5 mm axial images were obtained   through the abdomen and pelvis. Delayed images were also obtained. Postprocessing of images was performed and Coronal reconstructions were   generated. Dose modulation was utilized to reduce the overall dose   administered to the patient. CONTRAST: 100 mL Optiray    FINDINGS:  There are no enhancing renal masses. There are no renal or ureteral calculi. No hydronephrosis or hydroureter is identified. There is no perinephric soft   tissue density.  There is normal accumulation and excretion of contrast in the renal parenchyma. No endopelvic or endo ureteral masses are identified. The posterior margin of the bladder wall is unremarkable. Stable small hepatic hypodensities. Status post cholecystectomy. Aortic   atherosclerotic changes. No abdominal aortic aneurysm. The liver is normal   without focal lesion. The spleen and pancreas are normal. There is no   mesenteric or retroperitoneal lymphadenopathy. The adrenal glands are   normal.  There is no free intraperitoneal air or fluid. There is no bowel   wall thickening or dilatation. The urinary bladder is normal> . The aorta   is normal in course and caliber. No lytic or blastic lesion is identified. IMPRESSION:    No renal, ureteral or bladder masses. No renal or ureteral calculi. Status post cholecystectomy. Stable hepatic hypodensities. Signing/Reading Doctor: Xiomara Ji ([id number])    Approved: Xiomara Ji ([id number])  Jan 11 2013  8:17AM                                            Assessment and Plan:    The patient is a pleasant 80-year-old female with history of Parkinson's disease, dementia, depression who was recently admitted for shortness of breath and atrial fibrillation. Her neurological examination does reveal decreased cognitive functioning and mild symptoms of parkinsonism. Parkinson's disease:  She is followed closely by her outpatient neurologist and has a follow-up appointment scheduled for next month. Her cousin was able to provide additional insight today. The patient does take Rytary (extended release carbidopa/levadopa)  twice a day but there is a question about her recent compliance. The cousin will work on verifying dose to get her started on the correct dosing of the medication. Alzheimer's dementia:  Please restart her home dosing of donepezil 10 mg daily. Depression:  She does take sertraline at home. Dosing will need to be verified.   Once verified, please restart home medication. Thank you for the consult. There is no other additional neurology recommendations at this time. If questions arise, please not hesitate to contact me and I will return to see the patient. The patient should keep her follow up appt with her primary neurologist already scheduled for 5/2021. Active Problems:    Pulmonary edema (4/17/2021)      Generalized weakness (4/17/2021)      New onset atrial fibrillation ([address] (4/17/2021)      Acute respiratory failure with hypoxia ([address] (4/17/2021)               I spent  70   minutes providing care to this  acutely ill inpatient with > 50% of the time counseling and assisting in the coordination of care of the patient on the patient's hospital floor/unit.            Signed By:  April Rocha DO FAAN    April 18, 2021

## 2021-04-18 NOTE — ED NOTES
TRANSFER - OUT REPORT:    Verbal report given to Mariel Ferris RN(name) on Elida Dillon  being transferred to [facility](unit) for routine progression of care       Report consisted of patients Situation, Background, Assessment and   Recommendations(SBAR). Information from the following report(s) SBAR, ED Summary, Intake/Output, MAR and Recent Results was reviewed with the receiving nurse. Lines:   Peripheral IV 04/17/21 Right Antecubital (Active)   Site Assessment Clean, dry, & intact 04/17/21 1000   Phlebitis Assessment 0 04/17/21 1000   Infiltration Assessment 0 04/17/21 1000   Dressing Status Clean, dry, & intact 04/17/21 1000   Hub Color/Line Status Pink 04/17/21 1000        Opportunity for questions and clarification was provided.       Patient transported with:   [email or web address]

## 2021-04-18 NOTE — CONSULTS
Subjective:      Date of  Admission: 4/17/2021  9:49 AM     Admission type:Emergency    Gilma Adan is a 80 y.o. female admitted for Acute respiratory failure with hypoxia ([facility] [address] [J96.01]; Generalized weakness [R53.1]; New onset atrial fibrillation ([facility] [address] [I48.91]; Pulmonary edema [id number]. 1]Cardiology consult requested for a. Fib. Patient is poor historian and per  her SOB has been getting worse. C/o generalized weakness. Feels better now. Per  she denies chest pain, chest pressure/discomfort, palpitations, irregular heart beats, orthopnea, paroxysmal nocturnal dyspnea, exertional chest pressure/discomfort, claudication, lower extremity edema. Has not followed up with neurology.      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    Depression-anxiety 12/11/2014    Fatigue 12/11/2014    Valvular heart disease--mild-mod MR/TR/AI 09/11/2014    DJD (degenerative joint disease)--hips--painful 09/11/2014    Palpitation 09/10/2014    Chronic insomnia 08/26/2014    COPD (chronic obstructive pulmonary disease) ([facility] [address] 08/26/2014    Breast calcification seen on mammogram     Tobacco use disorder 02/03/2014    Dyslipidemia 02/03/2014    Chronic airway obstruction, not elsewhere classified 02/03/2014    NEGRO (dyspnea on exertion) 02/03/2014    Hypothyroid 11/18/2013    Vitamin D deficiency 11/18/2013    Mitral valve disorders(424.0) 06/28/2012    Headache(784.0) 10/06/2011    Psoriasis 08/01/2011    Intractable ophthalmic migraine     Dry eye syndrome     Anxiety state, unspecified     GERD (gastroesophageal reflux disease)     Hemorrhoids, internal 06/01/2003      Jalen Graves MD  Past Medical History:   Diagnosis Date    Allergy, unspecified not elsewhere classified     Anxiety state, unspecified     Breast calcification seen on mammogram 2011    bilaterally.  Bursitis of hip     with Tendonitis. Dr. Kelli Coreas. Dr. Jorge Luis Rodriguez. Dr. Juan Cespedes.  Cataract 2013    bilaterally. Dr. Cayla Manley.  COPD     Dry eye syndrome     Dr. Priya De La Rosa Gallstone 2002    GERD (gastroesophageal reflux disease)     H. pylori positive Txd. Dr. Pitero Horvath    Heart murmur 12/2011    Moderate MR. Moderate TR. Moderate AoR. Normal Stress Test.  Dr. Wang Reid.  Hemorrhoids, internal 06/2003    Dr. Celina Burns Hiatal hernia     Dr. Celina Burns Hyperthyroidism 1980s    Txd with APARICIO    IBS (irritable bowel syndrome) 1958    Intractable ophthalmic migraine     Lactose intolerance     Lyme disease 09/2011    Txd with Doxy x 21. Dr. Daniel Salguero Osteopenia 12/2002, 09/2004,8/2012    Dr. Elsy Roy.  Other and unspecified hyperlipidemia     Parkinson disease ([address]     Paroxysmal SVT (supraventricular tachycardia) ([address] 09/01/14    Dr. Martha Costa. Dr. Naman Brewer.    Polyuria 2013    Psoriasis 08/2011    Left ear. Dr. Teresa Gutierrez RBBB (right bundle branch block) 12/2011    Dr. Martha Costa. Normal Stress Test 12/2011.  SBO (small bowel obstruction) ([address] 1970s    Unspecified hypothyroidism 1980s    Valvular heart disease 2014    mild-mod MR.  TR.  AI. Dr. Dean Pryor D deficiency 06/2011      Past Surgical History:   Procedure Laterality Date    COLONOSCOPY  06/2003    Dr. Pietro Horvath. due q 10 yrs    HX BREAST BIOPSY  2011    Right. benign. [facility].     HX CATARACT REMOVAL Bilateral 10/15/2013 (Right), 11/06/2013 (Left)    Dr. Nena Rees  [id number]    Dr. Vicky Morrow MD    HX LAP CHOLECYSTECTOMY      HX ORTHOPAEDIC      some type of foot surgery 30 yrs ago    HX ARCELIA AND BSO  1973    due to fibroids then SBO    HX TONSILLECTOMY  1956    NH APPENDECTOMY  1957    NH COLSC FLX W/RMVL OF TUMOR POLYP LESION SNARE TQ  3/14/2013         NH COLSC FLX W/RMVL OF TUMOR POLYP LESION SNARE TQ  3/14/2013         NH LAP,CHOLECYSTECTOMY  2002    Dr. Pati Banegas.      Allergies   Allergen Reactions    Azithromycin Nausea Only     Loss of appetite, jittery    Fosamax [Alendronate] Other (comments)     Ulcers    Zocor [Simvastatin] Myalgia     Able to take 10 mg daily only      Family History   Problem Relation Age of Onset    Heart Disease Mother         chf    Hypertension Mother     Arthritis-osteo Mother     Stroke Mother     Thyroid Disease Sister     Diabetes Maternal Grandmother     Kidney Disease Other         tumor present      Current Facility-Administered Medications   Medication Dose Route Frequency    metoprolol tartrate (LOPRESSOR) tablet 12.5 mg  12.5 mg Oral Q12H    dilTIAZem ER (CARDIZEM CD) capsule 120 mg  120 mg Oral DAILY    sodium chloride (NS) flush 5-40 mL  5-40 mL IntraVENous Q8H    sodium chloride (NS) flush 5-40 mL  5-40 mL IntraVENous PRN    acetaminophen (TYLENOL) tablet 650 mg  650 mg Oral Q6H PRN    Or    acetaminophen (TYLENOL) suppository 650 mg  650 mg Rectal Q6H PRN    polyethylene glycol (MIRALAX) packet 17 g  17 g Oral DAILY PRN    promethazine (PHENERGAN) tablet 12.5 mg  12.5 mg Oral Q6H PRN    Or    ondansetron (ZOFRAN) injection 4 mg  4 mg IntraVENous Q6H PRN    enoxaparin (LOVENOX) injection 50 mg  1 mg/kg SubCUTAneous Q12H    potassium chloride (K-DUR, KLOR-CON) SR tablet 20 mEq  20 mEq Oral BID    levothyroxine (SYNTHROID) tablet 100 mcg  100 mcg Oral 6am    atorvastatin (LIPITOR) tablet 10 mg  10 mg Oral QHS    montelukast (SINGULAIR) tablet 10 mg  10 mg Oral QHS    labetaloL (NORMODYNE;TRANDATE) injection 10 mg  10 mg IntraVENous Q4H PRN  furosemide (LASIX) injection 20 mg  20 mg IntraVENous BID         Review of Symptoms:  Constitutional: negative  Eyes: negative  Ears, nose, mouth, throat, and face: negative  Respiratory: No cough, hemoptysis, URI. Cardiovascular: No CP, palpitations, sweating, lightheadedness, dizziness, syncope, presyncope, lower extremity swelling. Gastrointestinal: No nausea, vomiting, diarrhea, constipation, abdominal pain, hematemesis, melena, hematochezia  Genitourinary:No urinary complaints. Musculoskeletal:negative  Neurological: as above otherwise negative  Behvioral/Psych: negative  Endocrine: negative     Subjective:      Visit Vitals  BP (!) 170/81   Pulse 81   Temp 98.1 °F (36.7 °C)   Resp 17   Ht 5' 2\" (1.575 m)   Wt 120 lb (54.4 kg)   SpO2 90%   BMI 21.95 kg/m²       Physical Exam  Resting comfortably. No resp distress  Abdomen: soft, non-tender.  Bowel sounds normal.   Extremities: extremities normal, atraumatic, no cyanosis or edema  Heart: Irregular rate and rhythm, S1, S2 normal, no murmur, click, rub or gallop  Lungs: clear to auscultation bilaterally  Neck: supple, no carotid bruit and no JVD  Neurologic: Grossly normal  Pulses: 2+ and symmetric      Cardiographics    Telemetry: a. fib    ECG: a. fib    Echocardiogram: Not done    Labs:   Recent Results (from the past 24 hour(s))   SARS-COV-2    Collection Time: 04/17/21  3:01 PM   Result Value Ref Range    SARS-CoV-2 Please find results under separate order     SARS-COV-2, PCR    Collection Time: 04/17/21  3:01 PM    Specimen: Nasopharyngeal   Result Value Ref Range    Specimen source Nasopharyngeal      SARS-CoV-2 Not detected NOTD     METABOLIC PANEL, BASIC    Collection Time: 04/18/21  3:25 AM   Result Value Ref Range    Sodium 144 136 - 145 mmol/L    Potassium 3.5 3.5 - 5.1 mmol/L    Chloride 110 (H) 97 - 108 mmol/L    CO2 33 (H) 21 - 32 mmol/L    Anion gap 1 (L) 5 - 15 mmol/L    Glucose 92 65 - 100 mg/dL    BUN 15 6 - 20 MG/DL    Creatinine 0.89 0.55 - 1.02 MG/DL    BUN/Creatinine ratio 17 12 - 20      GFR est AA >60 >60 ml/min/1.73m2    GFR est non-AA >60 >60 ml/min/1.73m2    Calcium 8.7 8.5 - 10.1 MG/DL   MAGNESIUM    Collection Time: 04/18/21  3:25 AM   Result Value Ref Range    Magnesium 2.1 1.6 - 2.4 mg/dL   CBC W/O DIFF    Collection Time: 04/18/21  3:25 AM   Result Value Ref Range    WBC 9.3 3.6 - 11.0 K/uL    RBC 3.88 3.80 - 5.20 M/uL    HGB 11.8 11.5 - 16.0 g/dL    HCT 37.3 35.0 - 47.0 %    MCV 96.1 80.0 - 99.0 FL    MCH 30.4 26.0 - 34.0 PG    MCHC 31.6 30.0 - 36.5 g/dL    RDW 13.4 11.5 - 14.5 %    PLATELET 816 249 - 373 K/uL    MPV 9.9 8.9 - 12.9 FL    NRBC 0.0 0  WBC    ABSOLUTE NRBC 0.00 0.00 - 0.01 K/uL   ECHO ADULT COMPLETE    Collection Time: 04/18/21  9:12 AM   Result Value Ref Range    IVSd 1.01 (A) 0.60 - 0.90 cm    LVIDd 4.57 3.90 - 5.30 cm    LVIDs 3.85 cm    LVOT d 1.90 cm    LVPWd 0.88 0.60 - 0.90 cm    LVOT Cardiac Output 3.34 liter/minute    LVOT Peak Gradient 4.10 mmHg    Left Ventricular Outflow Tract Mean Gradient 1.63 mmHg    LVOT SV 47.8 mL    LVOT Peak Velocity 101.21 cm/s    LVOT VTI 16.83 cm    Left Atrium Major Axis 3.31 cm    Aortic Valve Area by Continuity of Peak Velocity 1.91 cm2    Aortic Valve Area by Continuity of VTI 1.82 cm2    AV R PG 90.93 mmHg    Aortic Regurgitant Pressure Half-time 374.18 ms    AR Max John 476.78 cm/s    AoV PG 9.11 mmHg    Aortic Valve Systolic Mean Gradient 0.60 mmHg    Aortic Valve Systolic Peak Velocity 623.86 cm/s    AoV VTI 26.34 cm    PISA MR Rad 0.98 cm    MV A John 58.43 cm/s    Mitral Valve E Wave Deceleration Time 174.60 ms    MV E John 121.17 cm/s    E/E' lateral 15.74     LV E' Lateral Velocity 7.70 cm/s    MVA VTI 1.73 cm2    Mitral Effective Regurgitant Orifice Area 0.38 cm2    MV regurgitant volume 78.19 mL    TR Max Velocity 645.87 cm/s    MV Peak Gradient 9.20 mmHg    MV Mean Gradient 2.93 mmHg    Mitral Valve Pressure Half-time 50.65 ms    Mitral Valve Max Velocity 151.68 cm/s    Mitral Valve Annulus Velocity Time Integral 27.72 cm    MVA (PHT) 4.34 cm2    Mitral Regurgitant PISA Peak Velocity 674.20 cm/s    Mitral Regurgitant Velocity Time Integral 203.36 cm    Pulmonic Valve Systolic Peak Instantaneous Gradient 1.85 mmHg    Pulmonic Valve Max Velocity 68.08 cm/s    Triscuspid Valve Regurgitation Peak Gradient 32.39 mmHg    TR Max Velocity 283.45 cm/s    AO ASC D 3.11 cm    Ao Root D 3.09 cm    MV E/A 2.07     LV Mass .5 67.0 - 162.0 g    LV Mass AL Index 94.6 43.0 - 95.0 g/m2    Left Atrium Minor Axis 2.15 cm    DEVIKA/BSA Pk John 1.2 cm2/m2    DEVIKA/BSA VTI 1.2 cm2/m2        Assessment:     Assessment:       Active Problems:    Pulmonary edema (4/17/2021)      Generalized weakness (4/17/2021)      New onset atrial fibrillation ([address] (4/17/2021)      Acute respiratory failure with hypoxia ([address] (4/17/2021)         Plan:     1. PAF: h/o SVT ablation. Rate controlled now. Add BB. Switch to longer acting cardizem. F/u Echo. On lovenox. 2. SOB, generalized weakness, h/o parkinson's disease. No recent evaluation. ? Progression of parkinsonism. Consider neuro evaluation. Per  memory is declining. Continue diuretic   3. Uncontrolled. HTN: add BB. Monitor.

## 2021-04-18 NOTE — PROGRESS NOTES
End of Shift Note    Bedside shift change report given to Dianna (oncoming nurse) by Chichi Pollard (offgoing nurse). Report included the following information SBAR, Kardex, Intake/Output, MAR and Recent Results    Shift worked:  [id number]     Shift summary and any significant changes:     Pt admitted from ED, very pleasant and A/Ox3, disoriented to situation of being in the hospital. Began admission database but pt repeatedly said she \"didn't know\" the answers to questions. Very pleasant and rested well overnight. Concerns for physician to address:  none     Zone phone for oncoming shift:          Activity:  Activity Level: Up with Assistance  Number times ambulated in hallways past shift: 0  Number of times OOB to chair past shift: 0    Cardiac:   Cardiac Monitoring: Yes      Cardiac Rhythm: Atrial fibrillation    Access:   Current line(s): PIV     Genitourinary:   Urinary status: voiding    Respiratory:   O2 Device: Nasal cannula  Chronic home O2 use?: YES  Incentive spirometer at bedside: NO     GI:  Last Bowel Movement Date: 04/15/21  Current diet:  DIET CARDIAC Regular  Passing flatus: YES  Tolerating current diet: YES       Pain Management:   Patient states pain is manageable on current regimen: N/A    Skin:  Boo Score: 20  Interventions: increase time out of bed    Patient Safety:  Fall Score:  Total Score: 2  Interventions: pt to call before getting OOB       Length of Stay:  Expected LOS: - - -  Actual LOS: 1      Chichi Pollard

## 2021-04-18 NOTE — PROGRESS NOTES
TRANSFER - IN REPORT:    Verbal report received from ROSE Bustamante(name) on Lili Nissen  being received from [employer]unit) for routine progression of care      Report consisted of patients Situation, Background, Assessment and   Recommendations(SBAR). Information from the following report(s) SBAR, MAR and Recent Results was reviewed with the receiving nurse. Opportunity for questions and clarification was provided. Assessment completed upon patients arrival to unit and care assumed.

## 2021-04-18 NOTE — PROGRESS NOTES
Transition of Care Plan:    RUR: 11%  Disposition: TBD  Follow up appointments: TBD  DME needed: TBD  Transportation at [address] or means to access home:  has keys   IM Medicare letter: Prior to discharge   Caregiver Contact: Marysol Acosta () [phone]  Discharge Caregiver contacted prior to discharge? Reason for Admission:  Acute hypoxic respiratory failure              Plan for utilizing home health:     TBD    PCP: First and Last name:  Magda Spurling, MD     Are you a current patient: Yes/No: Yes Date of last visit: March 2021   Can you participate in a virtual visit with your PCP: Yes                    Current Advanced Directive/Advance Care Plan: DNR                    Transition of Care Plan:        TBD                CM acknowledges inpatient admission. CM spoke with patient over the phone due to current isolation precautions. CM introduced role, verified demographics, and discussed discharge planning. ADL's/IADL's - independent but does not drive  DME- n/a  Preferred Rx - [facility] on [address]    to provide transportation at time of discharge. No previous home health, SNF, or acute rehab. Patient lives with her  in a one level home with 2-3 MAGED. Pt. is DNR status and does not have an ACP on file. CM discussed with pt. about ACP. Pt stated she would not like ACP addressed at this time. CM will continue to follow patient for discharge planning needs and arrange for services as deemed necessary. Care Management Interventions  PCP Verified by CM:  Yes  Mode of Transport at Discharge: ( )  Transition of Care Consult (CM Consult): (TBD)  MyChart Signup: No  Discharge Durable Medical Equipment: No  Physical Therapy Consult: No  Occupational Therapy Consult: No  Speech Therapy Consult: No  Current Support Network: Lives with Spouse  Confirm Follow Up Transport: Family     Candelaria Lozano, MSN, RN  Care Manager

## 2021-04-19 VITALS
BODY MASS INDEX: 21.66 KG/M2 | WEIGHT: 117.73 LBS | HEART RATE: 88 BPM | RESPIRATION RATE: 18 BRPM | HEIGHT: 62 IN | SYSTOLIC BLOOD PRESSURE: 139 MMHG | DIASTOLIC BLOOD PRESSURE: 87 MMHG | TEMPERATURE: 98.3 F | OXYGEN SATURATION: 98 %

## 2021-04-19 PROCEDURE — 99233 SBSQ HOSP IP/OBS HIGH 50: CPT | Performed by: INTERNAL MEDICINE

## 2021-04-19 PROCEDURE — 94760 N-INVAS EAR/PLS OXIMETRY 1: CPT

## 2021-04-19 PROCEDURE — 74011250636 HC RX REV CODE- 250/636: Performed by: HOSPITALIST

## 2021-04-19 PROCEDURE — 74011250637 HC RX REV CODE- 250/637: Performed by: HOSPITALIST

## 2021-04-19 PROCEDURE — 74011250637 HC RX REV CODE- 250/637: Performed by: INTERNAL MEDICINE

## 2021-04-19 PROCEDURE — 77010033678 HC OXYGEN DAILY

## 2021-04-19 RX ORDER — DONEPEZIL HYDROCHLORIDE 10 MG/1
10 TABLET, FILM COATED ORAL DAILY
COMMUNITY

## 2021-04-19 RX ORDER — CLONAZEPAM 1 MG/1
1 TABLET ORAL
COMMUNITY
End: 2021-05-06

## 2021-04-19 RX ORDER — SERTRALINE HYDROCHLORIDE 50 MG/1
50 TABLET, FILM COATED ORAL DAILY
COMMUNITY

## 2021-04-19 RX ORDER — METOPROLOL SUCCINATE 25 MG/1
12.5 TABLET, EXTENDED RELEASE ORAL DAILY
COMMUNITY
End: 2021-05-06

## 2021-04-19 RX ORDER — FUROSEMIDE 40 MG/1
40 TABLET ORAL DAILY
Qty: 30 TAB | Refills: 1 | Status: SHIPPED | OUTPATIENT
Start: 2021-04-19

## 2021-04-19 RX ORDER — POTASSIUM CHLORIDE 20 MEQ/1
20 TABLET, EXTENDED RELEASE ORAL DAILY
Qty: 30 TAB | Refills: 1 | Status: SHIPPED | OUTPATIENT
Start: 2021-04-19

## 2021-04-19 RX ORDER — METOPROLOL TARTRATE 25 MG/1
12.5 TABLET, FILM COATED ORAL EVERY 12 HOURS
Qty: 60 TAB | Refills: 1 | Status: SHIPPED | OUTPATIENT
Start: 2021-04-19

## 2021-04-19 RX ORDER — FLUDROCORTISONE ACETATE 0.1 MG/1
0.1 TABLET ORAL DAILY
COMMUNITY

## 2021-04-19 RX ORDER — LEVOTHYROXINE SODIUM 100 UG/1
100 TABLET ORAL
COMMUNITY

## 2021-04-19 RX ORDER — DILTIAZEM HYDROCHLORIDE 120 MG/1
120 CAPSULE, COATED, EXTENDED RELEASE ORAL DAILY
Qty: 30 CAP | Refills: 1 | Status: SHIPPED | OUTPATIENT
Start: 2021-04-20

## 2021-04-19 RX ORDER — LEVODOPA AND CARBIDOPA 145; 36.25 MG/1; MG/1
1 CAPSULE, EXTENDED RELEASE ORAL 2 TIMES DAILY
COMMUNITY

## 2021-04-19 RX ADMIN — Medication 10 ML: at 05:42

## 2021-04-19 RX ADMIN — DILTIAZEM HYDROCHLORIDE 120 MG: 120 CAPSULE, COATED, EXTENDED RELEASE ORAL at 08:21

## 2021-04-19 RX ADMIN — ENOXAPARIN SODIUM 50 MG: 60 INJECTION SUBCUTANEOUS at 04:01

## 2021-04-19 RX ADMIN — LEVOTHYROXINE SODIUM 100 MCG: 0.1 TABLET ORAL at 05:42

## 2021-04-19 RX ADMIN — ENOXAPARIN SODIUM 50 MG: 60 INJECTION SUBCUTANEOUS at 16:12

## 2021-04-19 RX ADMIN — POTASSIUM CHLORIDE 20 MEQ: 1500 TABLET, EXTENDED RELEASE ORAL at 17:33

## 2021-04-19 RX ADMIN — FUROSEMIDE 20 MG: 10 INJECTION, SOLUTION INTRAMUSCULAR; INTRAVENOUS at 17:33

## 2021-04-19 RX ADMIN — Medication 10 ML: at 16:12

## 2021-04-19 RX ADMIN — POTASSIUM CHLORIDE 20 MEQ: 1500 TABLET, EXTENDED RELEASE ORAL at 08:21

## 2021-04-19 RX ADMIN — FUROSEMIDE 20 MG: 10 INJECTION, SOLUTION INTRAMUSCULAR; INTRAVENOUS at 08:22

## 2021-04-19 RX ADMIN — METOPROLOL TARTRATE 12.5 MG: 25 TABLET, FILM COATED ORAL at 08:21

## 2021-04-19 NOTE — PROGRESS NOTES
Received notification from bedside RN about patient with regards to: requesting medication to help her sleep  VS: /91, HR 78, RR 20, O2 sat 92% on NC 3 L    Intervention given: Melatonin PO x 1 dose ordered

## 2021-04-19 NOTE — PROGRESS NOTES
Problem: Falls - Risk of  Goal: *Absence of Falls  Description: Document Moses Montalvo Fall Risk and appropriate interventions in the flowsheet.   Outcome: Progressing Towards Goal  Note: Fall Risk Interventions:            Medication Interventions: Teach patient to arise slowly, Patient to call before getting OOB    Elimination Interventions: Bed/chair exit alarm              Problem: Patient Education: Go to Patient Education Activity  Goal: Patient/Family Education  Outcome: Progressing Towards Goal

## 2021-04-19 NOTE — PROGRESS NOTES
Hospitalist Progress Note    NAME: Oscar Aquino   :     MRN:  [id number]       Assessment / Plan:  Acute hypoxic respiratory failure, POA due to  Bilateral interstitial infiltrates likely pulmonary edema, POA with new onset diastolic chf, POA EF 59-74% in   Moderate AI and moderate MR on prior echo  -sars cov 2 negative  -cont lasix. Check bmp in am  -cont chf protocol with daily erin and I/0  -check pro saeid level      New onset afib with intermittent RVR, POA    Prior hx of SVT s/p RF  -Cont lovenox. Cards following  -cont po cardizem and metoprolol      Uncontrolled HTN, new dx  Hypokalemia, POA 3.3  --cont metoprolol      Parkinson disease  -consult neuro per cards recommendation      Acquired hypothyroid -- tsh normal today  Copd not on home O2  Tobacco use -- advised patient to quit smoking.     --need med reconciliation since patient cannot provide PTA meds           Body mass index is 21.95 kg/m².     Code:  DNR, DNI as per patient's wishes upon discussion  DVT prophylaxis: lovenox  Surrogate decision maker:        Body mass index is 21.95 kg/m². Subjective:     Chief Complaint / Reason for Physician Visit  Reports sob with cough. Review of Systems:  Symptom Y/N Comments  Symptom Y/N Comments   Fever/Chills    Chest Pain     Poor Appetite    Edema     Cough    Abdominal Pain     Sputum    Joint Pain     SOB/NEGRO    Pruritis/Rash     Nausea/vomit    Tolerating PT/OT     Diarrhea    Tolerating Diet     Constipation    Other       Could NOT obtain due to:      Objective:     VITALS:   Last 24hrs VS reviewed since prior progress note.  Most recent are:  Patient Vitals for the past 24 hrs:   Temp Pulse Resp BP SpO2   21 1939 97.9 °F (36.6 °C) 78 20 (!) 162/91 92 %   21 1904 98 °F (36.7 °C) 79 20 (!) 156/91 91 %   21 1613 97.6 °F (36.4 °C) 77 18 (!) 152/94 91 %   21 1159 98.1 °F (36.7 °C) 81 17 (!) 170/81 90 %   21 0854    (!) 164/74  04/18/21 0649 97.7 °F (36.5 °C) 77 17 (!) 164/74 94 %   04/18/21 0321 97.5 °F (36.4 °C) 75 19 135/72 90 %   04/17/21 2251 98.2 °F (36.8 °C) 74 22 135/69 90 %       Intake/Output Summary (Last 24 hours) at 4/18/2021 2231  Last data filed at 4/18/2021 2136  Gross per 24 hour   Intake 365 ml   Output 1850 ml   Net -1485 ml        PHYSICAL EXAM:  General: cooperative, no acute distress    EENT:  EOMI. Anicteric sclerae. MMM  Resp:  CTA bilaterally, crackles +,   No accessory muscle use  CV:  Regular  rhythm,  No edema  GI:  Soft, Non distended, Non tender.  +Bowel sounds  Neurologic:  Alert and oriented X 3, normal speech,   Psych:    Not anxious nor agitated  Skin:  No rashes.   No jaundice    Reviewed most current lab test results and cultures  YES  Reviewed most current radiology test results   YES  Review and summation of old records today    NO  Reviewed patient's current orders and MAR    YES  PMH/SH reviewed - no change compared to H&P          Current Facility-Administered Medications:     metoprolol tartrate (LOPRESSOR) tablet 12.5 mg, 12.5 mg, Oral, Q12H, Mainor Dixon MD, 12.5 mg at 04/18/21 2134    dilTIAZem ER (CARDIZEM CD) capsule 120 mg, 120 mg, Oral, DAILY, Mainor Dixon MD, 120 mg at 04/18/21 1248    sodium chloride (NS) flush 5-40 mL, 5-40 mL, IntraVENous, Q8H, Brisa Funes MD, 10 mL at 04/18/21 2135    sodium chloride (NS) flush 5-40 mL, 5-40 mL, IntraVENous, PRN, Brisa Funes MD    acetaminophen (TYLENOL) tablet 650 mg, 650 mg, Oral, Q6H PRN **OR** acetaminophen (TYLENOL) suppository 650 mg, 650 mg, Rectal, Q6H PRN, Brisa Funes MD    polyethylene glycol (MIRALAX) packet 17 g, 17 g, Oral, DAILY PRN, Brisa Funes MD    promethazine (PHENERGAN) tablet 12.5 mg, 12.5 mg, Oral, Q6H PRN **OR** ondansetron (ZOFRAN) injection 4 mg, 4 mg, IntraVENous, Q6H PRN, Brisa Funes MD    enoxaparin (LOVENOX) injection 50 mg, 1 mg/kg, SubCUTAneous, Q12H, Brisa Mountain, MD, 50 mg at 04/18/21 1747    potassium chloride (K-DUR, KLOR-CON) SR tablet 20 mEq, 20 mEq, Oral, BID, Rony Holm MD, 20 mEq at 04/18/21 1746    levothyroxine (SYNTHROID) tablet 100 mcg, 100 mcg, Oral, 6am, Rony Holm MD, 100 mcg at 04/18/21 0556    atorvastatin (LIPITOR) tablet 10 mg, 10 mg, Oral, QHS, Rony Holm MD, 10 mg at 04/18/21 2134    montelukast (SINGULAIR) tablet 10 mg, 10 mg, Oral, QHS, Rony Holm MD, 10 mg at 04/18/21 2134    labetaloL (NORMODYNE;TRANDATE) injection 10 mg, 10 mg, IntraVENous, Q4H PRN, Rony Holm MD    furosemide (LASIX) injection 20 mg, 20 mg, IntraVENous, BID, Rony Holm MD, 20 mg at 04/18/21 1746  ________________________________________________________________________  Care Plan discussed with:    Comments   Patient y    Family      RN y    Care Manager     Consultant                        Multidiciplinary team rounds were held today with , nursing, pharmacist and clinical coordinator. Patient's plan of care was discussed; medications were reviewed and discharge planning was addressed. ________________________________________________________________________  Total NON critical care TIME:  35    Minutes    Total CRITICAL CARE TIME Spent:   Minutes non procedure based      Comments   >50% of visit spent in counseling and coordination of care     ________________________________________________________________________  Carlos Camejo MD     Procedures: see electronic medical records for all procedures/Xrays and details which were not copied into this note but were reviewed prior to creation of Plan. LABS:  I reviewed today's most current labs and imaging studies.   Pertinent labs include:  Recent Labs     04/18/21  0325 04/17/21  1039   WBC 9.3 9.8   HGB 11.8 12.9   HCT 37.3 40.6    262     Recent Labs     04/18/21  0325 04/17/21  1039    144   K 3.5 3.3*   * 109*   CO2 33* 30   GLU 92 112*   BUN 15 19   CREA 0. 89 1.06*   CA 8.7 8.6   MG 2.1 2.4   PHOS  --  3.6   ALB  --  3.1*   TBILI  --  0.8   ALT  --  20   INR  --  1.1       Signed: Jose Marroquin MD

## 2021-04-19 NOTE — PROGRESS NOTES
26 - Patient's cousin, Adriana Bullard, would like to speak with Dr. Raymond Mann regarding patient's plan of care and discharge, patient gave permission to speak with cousin and patient's  has been contacting cousin for information regarding patient's care, Dr. Raymond Mann notified via [employer]. 200 - Discharge information provided to patient, patient's spouse and over the phone to patient's cousin, Adriana Bullard. Discharge information included home medications, follow up appointments and home oxygen instructions. PIV was removed with catheter tip intact. Opportunities for questions were given to all parties involved with discharge information. Patient was wheeled downstairs with belongings by staff to be picked up by .

## 2021-04-19 NOTE — PROGRESS NOTES
Pulse oximetry assessment   93% at rest on room air (if 88% or less, skip next steps)  88% while ambulating on room air  96% at rest on 3LPM  95% while ambulating on 3LPM

## 2021-04-19 NOTE — DISCHARGE SUMMARY
Hospitalist Discharge Summary     Patient ID:  Ryder Martinez  [id number]  52 y.o.  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 female with PMH pSVT s/p RF ablation in 2015, Parkinson's disease, hypothyroidism referred from [facility] for hypoxia O2 sat 88% RA.     Patient reports about 1 month of fatigue, NEGRO, nonproductive cough, mild wheezing, lost of taste, smell intact, had covid-19 vaccine second shot 3 weeks ago (March 25). Has decreased appetite, lost few lbs, no swelling, no fever or chills, no chest pain, no bleeding, skin or hair changes, sometimes feel dizzy, lightheaded. Occasional palpitations.       We were asked to admit for work up and evaluation of the above problems. \"    ______________________________________________________________________  DISCHARGE SUMMARY/HOSPITAL COURSE:  for full details see H&P, daily progress notes, labs, consult notes.      Acute hypoxic respiratory failure, POA due to  Bilateral interstitial infiltrates likely pulmonary edema, POA with new onset diastolic chf, POA EF 68-83% in 2014  Moderate AI and moderate MR on prior echo  -sars cov 2 negative  -cont lasix. Check bmp in am  -cont chf protocol with daily erin and I/0  -check pro saeid level        New onset afib with intermittent RVR, POA    Prior hx of SVT s/p RF  -Cont lovenox. Cards following  -cont po cardizem and metoprolol        Uncontrolled HTN, new dx  Hypokalemia, POA 3.3  --cont metoprolol      Parkinson disease  -consult neuro per cards recommendation        Acquired hypothyroid -- tsh normal today  Copd not on home O2  Tobacco use -- advised patient to quit smoking.     --need med reconciliation since patient cannot provide PTA meds           Body mass index is 21.95 kg/m².     Code:  DNR, DNI as per patient's wishes upon discussion        _______________________________________________________________________  Patient seen and examined by me on discharge day. Pertinent Findings:  Gen:    Not in distress  Chest: Clear lungs  CVS:   Regular rhythm. No edema  Abd:  Soft, not distended, not tender  Neuro:  Alert, oriented x 3  _______________________________________________________________________  DISCHARGE MEDICATIONS:   Current Discharge Medication List      START taking these medications    Details   dilTIAZem ER (CARDIZEM CD) 120 mg capsule Take 1 Cap by mouth daily. Qty: 30 Cap, Refills: 1      furosemide (Lasix) 40 mg tablet Take 1 Tab by mouth daily. Qty: 30 Tab, Refills: 1      metoprolol tartrate (LOPRESSOR) 25 mg tablet Take 0.5 Tabs by mouth every twelve (12) hours. Qty: 60 Tab, Refills: 1      potassium chloride (K-DUR, KLOR-CON) 20 mEq tablet Take 1 Tab by mouth daily. Qty: 30 Tab, Refills: 1         CONTINUE these medications which have NOT CHANGED    Details   LORazepam (ATIVAN) 0.5 mg tablet Take  by mouth. traMADol (ULTRAM) 50 mg tablet Take 50 mg by mouth every six (6) hours as needed for Pain.       tetracycline (ACHROMYCIN, SUMYCIN) 500 mg capsule Take  by mouth Before breakfast, lunch, dinner and at bedtime. montelukast (SINGULAIR) 10 mg tablet TAKE ONE TABLET BY MOUTH ONE TIME DAILY   Qty: 30 Tab, Refills: 2    Comments: Patient needs to schedule office visit      fluticasone (FLONASE) 50 mcg/actuation nasal spray USE TWO SPRAYS IN EACH NOSTRIL DAILY   Qty: 48 g, Refills: 5      VENTOLIN HFA 90 mcg/actuation inhaler Inhale two puffs by mouth every 4 to 6 hours as needed shortness of breath  Qty: 1 Inhaler, Refills: 0      traZODone (DESYREL) 50 mg tablet TAKE ONE TABLET BY MOUTH NIGHTLY AT BEDTIME   Qty: 30 Tab, Refills: 5      lovastatin (MEVACOR) 20 mg tablet TAKE ONE TABLET BY MOUTH NIGHTLY AT BEDTIME   Qty: 90 Tab, Refills: 1      levothyroxine (SYNTHROID) 88 mcg tablet TAKE ONE TABLET BY MOUTH EVERY MORNING BEFORE BREAKFAST  Qty: 90 tablet, Refills: 2    Associated Diagnoses: Hypothyroid      diclofenac (VOLTAREN) 1 % topical gel Apply 4 g to affected area four (4) times daily. Qty: 100 g, Refills: 1    Associated Diagnoses: Hip pain, bilateral      clonazePAM (KLONOPIN) 0.5 mg tablet Take 1 tablet by mouth nightly as needed. Indications: sleep, stress  Qty: 90 tablet, Refills: 0    Associated Diagnoses: Anxiety state, unspecified; Insomnia secondary to chronic pain      calcium-cholecalciferol, d3, (CALCIUM 600 + D) 600-125 mg-unit tab Take  by mouth. Takes 1/2 pill daily      aspirin delayed-release 81 mg tablet Take 81 mg by mouth daily. 1 tab Taking 3 times a week    Associated Diagnoses: Other and unspecified hyperlipidemia      acetaminophen (TYLENOL EXTRA STRENGTH) 500 mg tablet Take 500 mg by mouth every six (6) hours as needed. Indications: ARTHRITIC PAIN      magnesium hydroxide (MILK OF MAGNESIA) 400 mg/5 mL suspension Take 30 mL by mouth daily as needed. Associated Diagnoses: Constipation      omega-3 fatty acids-vitamin e (FISH OIL) 1,000 mg Cap Take 1,000 mg by mouth daily.     Associated Diagnoses: Other and unspecified hyperlipidemia Patient Follow Up Instructions: Activity: Activity as tolerated  Diet: Cardiac Diet    Follow-up with Cardiology Dr Mateo Walter ([facility] cardiology) in 2 weeks.   Follow-up tests/labs     Follow-up Information     Follow up With Specialties Details Why Contact Seb Graves MD Internal Medicine   [address] [place], [place] [address]Britton Flores [address]  [phone]          ________________________________________________________________    Risk of deterioration: Low    Condition at Discharge:  Stable  __________________________________________________________________    Disposition  Home with family, no needs    ____________________________________________________________________    Code Status: DNR/DNI  ___________________________________________________________________      Total time in minutes spent coordinating this discharge (includes going over instructions, follow-up, prescriptions, and preparing report for sign off to her PCP) :  >30 minutes    Signed:  Ventura Varner MD

## 2021-04-19 NOTE — PROGRESS NOTES
Comprehensive Nutrition Assessment    Type and Reason for Visit: Initial, Positive nutrition screen    Nutrition Recommendations/Plan:   Continue cardiac diet    Nutrition Assessment:     MST trigger for decreased appetite reported PTA. Pt noted for acute hypoxic respiratory failure, pulmonary edema, new diastolic CHF, new afib, new HTN, Parkinson's, hypothyroidism, COPD, dementia. Pt up and walking around the room at time of visit. She reports decreased sense of taste for about the past month, but she is unable to provide much hx. She states her usual weight is about 130lb. Current wt 117lb. Last EMR weight documented from 2019, so unable to determine the time frame of this weight loss. No family present to provide hx. Anticipate dc today. Patient Vitals for the past 168 hrs:   % Diet Eaten   04/18/21 1753 51 - 75%   04/18/21 1159 76 - 100%   04/18/21 1000 76 - 100%     Wt Readings from Last 5 Encounters:   04/19/21 53.4 kg (117 lb 11.6 oz)   12/07/19 59.9 kg (132 lb)   05/20/19 60.3 kg (133 lb)   06/05/18 58.1 kg (128 lb)   09/02/16 58.1 kg (128 lb)   ]    Malnutrition Assessment:  Malnutrition Status:  Insufficient data      Estimated Daily Nutrient Needs:  Energy (kcal): 8244-1677 kcal (25-30kcal/kg); Weight Used for Energy Requirements: Current  Protein (g): 53-64g (1-1.2g/kg); Weight Used for Protein Requirements: Current  Fluid (ml/day): 1500mL; Method Used for Fluid Requirements: 1 ml/kcal      Nutrition Related Findings:  Meds: lasix, synthroid, klorcon. BM 4/15.       Wounds:    None       Current Nutrition Therapies:  DIET CARDIAC Regular    Anthropometric Measures:  · Height:  5' 2\" (157.5 cm)  · Current Body Wt:  53.4 kg (117 lb 11.6 oz)   · Ideal Body Wt:  110 lbs:  107 %   · BMI Category:  Underweight (BMI less than 22) age over 72       Nutrition Diagnosis:   No nutrition diagnosis at this time    Nutrition Interventions:   Food and/or Nutrient Delivery: Continue current diet  Nutrition Education and Counseling: No recommendations at this time  Coordination of Nutrition Care: Continue to monitor while inpatient    Goals:  PO intake >50% meals next 5-7 days       Nutrition Monitoring and Evaluation:   Behavioral-Environmental Outcomes: None identified  Food/Nutrient Intake Outcomes: Food and nutrient intake, Supplement intake  Physical Signs/Symptoms Outcomes: Biochemical data, Weight, Skin, Nutrition focused physical findings, GI status    Discharge Planning:    Continue current diet     Electronically signed by Elbert Berry RD on 4/19/2021 at 2:56 PM    Contact: [id number]

## 2021-04-19 NOTE — PROGRESS NOTES
Problem: Falls - Risk of  Goal: *Absence of Falls  Description: Document Mauro Burgess Fall Risk and appropriate interventions in the flowsheet.   Outcome: Progressing Towards Goal  Note: Fall Risk Interventions:            Medication Interventions: Patient to call before getting OOB, Teach patient to arise slowly    Elimination Interventions: Bed/chair exit alarm, Call light in reach              Problem: Patient Education: Go to Patient Education Activity  Goal: Patient/Family Education  Outcome: Progressing Towards Goal

## 2021-04-19 NOTE — DISCHARGE INSTRUCTIONS
HOSPITALIST DISCHARGE INSTRUCTIONS    NAME: Mariola Thakkar   :  [birth year]   MRN:  [id number]     Date/Time:  2021 2:32 PM    ADMIT DATE: 2021     DISCHARGE DATE: 2021     DISCHARGE DIAGNOSIS:  Acute on chronic (CHF+ COPD) hypoxic respiratory failure, POA - needing 2L/m oxygen  due to Bilateral interstitial infiltrates likely pulmonary edema, POA with new onset diastolic chf, POA EF 03-50% in 2014- cont Lasix 40 mg daily  Due to Moderate AI and moderate MR POA (on prior echo)-- Deferred surgery at this time, wants medical management  New onset afib with intermittent RVR, POA  - started on Metoprolol, cardizem, Lasix, not a good Anticoagulation candidate  Prior hx of SVT s/p RF  Uncontrolled HTN, new dx  Hypokalemia, POA  Parkinson disease  Acquired hypothyroid -- tsh normal today  Copd not on home O2 POA- now on xygen  Tobacco use  DNR DNI       Active Problems:    Valvular heart disease--mod to severe MR and TR (2014)      Pulmonary edema (2021)      Generalized weakness (2021)      New onset atrial fibrillation ([address] (2021)      Acute respiratory failure with hypoxia ([address] (2021)         MEDICATIONS:  As per medication reconciliation  list  · It is important that you take the medication exactly as they are prescribed. · Keep your medication in the bottles provided by the pharmacist and keep a list of the medication names, dosages, and times to be taken in your wallet. · Do not take other medications without consulting your doctor. Pain Management: per above medications    What to do at Home    Recommended diet:  Cardiac Diet    Recommended activity: Activity as tolerated    If you have questions regarding the hospital related prescriptions or hospital related issues please call [facility] at [phone].     If you experience any of the following symptoms then please call your primary care physician or return to the emergency room if you cannot get hold of your doctor:  Fever, chills, nausea, vomiting, diarrhea, change in mentation, falling, bleeding, shortness of breath,     Follow Up:  Dr. Kalina Alicia MD  you are to call and set up an appointment to see them in 7-10 days. Dr Jean Driscoll ([facility] cardiology) in 2 weeks for AFib/CHF management    Information obtained by :  I understand that if any problems occur once I am at home I am to contact my physician. I understand and acknowledge receipt of the instructions indicated above.                                                                                                                                            Physician's or R.N.'s Signature                                                                  Date/Time                                                                                                                                              Patient or Representative Signature                                                          Date/Time

## 2021-04-19 NOTE — PROGRESS NOTES
4/19/2021 11:08 AM    Admit Date: 4/17/2021    Admit Diagnosis: Acute respiratory failure with hypoxia ([facility] [J96.01]; Generalized weakness [R53.1]; New onset atrial fibrillation ([facility] [I48.91]; Pulmonary edema [J81.1]    Subjective:     Carlos Gorman feels better. Remains in a. Fib. Anxious to go home. She denies chest pain, chest pressure/discomfort, palpitations, irregular heart beats, orthopnea, paroxysmal nocturnal dyspnea, exertional chest pressure/discomfort.     Visit Vitals  BP (!) 152/90   Pulse 90   Temp 98.2 °F (36.8 °C)   Resp 18   Ht 5' 2\" (1.575 m)   Wt 117 lb 11.6 oz (53.4 kg)   SpO2 95%   BMI 21.53 kg/m²     Current Facility-Administered Medications   Medication Dose Route Frequency    metoprolol tartrate (LOPRESSOR) tablet 12.5 mg  12.5 mg Oral Q12H    dilTIAZem ER (CARDIZEM CD) capsule 120 mg  120 mg Oral DAILY    sodium chloride (NS) flush 5-40 mL  5-40 mL IntraVENous Q8H    sodium chloride (NS) flush 5-40 mL  5-40 mL IntraVENous PRN    acetaminophen (TYLENOL) tablet 650 mg  650 mg Oral Q6H PRN    Or    acetaminophen (TYLENOL) suppository 650 mg  650 mg Rectal Q6H PRN    polyethylene glycol (MIRALAX) packet 17 g  17 g Oral DAILY PRN    promethazine (PHENERGAN) tablet 12.5 mg  12.5 mg Oral Q6H PRN    Or    ondansetron (ZOFRAN) injection 4 mg  4 mg IntraVENous Q6H PRN    enoxaparin (LOVENOX) injection 50 mg  1 mg/kg SubCUTAneous Q12H    potassium chloride (K-DUR, KLOR-CON) SR tablet 20 mEq  20 mEq Oral BID    levothyroxine (SYNTHROID) tablet 100 mcg  100 mcg Oral 6am    atorvastatin (LIPITOR) tablet 10 mg  10 mg Oral QHS    montelukast (SINGULAIR) tablet 10 mg  10 mg Oral QHS    labetaloL (NORMODYNE;TRANDATE) injection 10 mg  10 mg IntraVENous Q4H PRN    furosemide (LASIX) injection 20 mg  20 mg IntraVENous BID         Objective:      Visit Vitals  BP (!) 152/90   Pulse 90   Temp 98.2 °F (36.8 °C)   Resp 18   Ht 5' 2\" (1.575 m)   Wt 117 lb 11.6 oz (53.4 kg)   SpO2 95%   BMI 21.53 kg/m²       Physical Exam:  Resting comfortably. No resp distress. Extremities: extremities normal, atraumatic, no cyanosis or edema  Heart: Irregular rate and rhythm, S1, S2 normal, no murmur, click, rub or gallop  Lungs: clear to auscultation bilaterally  Neurologic: Grossly normal    Data Review:   Labs:  No results found for this or any previous visit (from the past 24 hour(s)). Telemetry: a fib      Assessment:     Active Problems:    Valvular heart disease--mod to severe MR and TR (9/11/2014)      Pulmonary edema (4/17/2021)      Generalized weakness (4/17/2021)      New onset atrial fibrillation ([facility] (4/17/2021)      Acute respiratory failure with hypoxia ([address] (4/17/2021)        Plan:     1. PAF: h/o SVT ablation. Rate controlled continue PO BB and cardizem. DOAC on dc. 2. Mod to severe MR and TR: d/w patient  and cousin in details. Most likely contributing to SOB and a. Fib. Further evaluation with cardiac cath, LADAN and valve team evaluation discussed. Per family her dementia has progressed significantly and they are not sure if she should get any further invasive treatment. They will think about it and get back to me. If they decide to go home then will f/u as out pt. 2. Parkinson's disease. Neurology evaluation noted. 3. HTN: continue current meds.

## 2021-04-19 NOTE — PROGRESS NOTES
Problem: Falls - Risk of  Goal: *Absence of Falls  Description: Document Leda Levine Fall Risk and appropriate interventions in the flowsheet.   4/19/2021 1807 by Virginia Winkler RN  Outcome: Progressing Towards Goal  Note: Fall Risk Interventions:            Medication Interventions: Patient to call before getting OOB, Teach patient to arise slowly    Elimination Interventions: Bed/chair exit alarm           4/19/2021 1130 by Virginia Winkler RN  Outcome: Progressing Towards Goal  Note: Fall Risk Interventions:            Medication Interventions: Teach patient to arise slowly, Patient to call before getting OOB    Elimination Interventions: Bed/chair exit alarm              Problem: Patient Education: Go to Patient Education Activity  Goal: Patient/Family Education  4/19/2021 1807 by Virginia Winkler RN  Outcome: Progressing Towards Goal  4/19/2021 1130 by Virginia Winkler RN  Outcome: Progressing Towards Goal

## 2021-04-19 NOTE — PROGRESS NOTES
Transition of Care Plan:     RUR: 14%  Disposition: Home   Follow up appointments: PCP follow-up   DME needed: home O2  ([facility])  Transportation at [address] or means to access home:  has keys    Medicare letter: Prior to discharge   Caregiver Contact: Marti Bernard () [phone]      CM notified of completed O2 assessment. CM sent orders to [facility]. Pt has been approved for home O2    2:19pm CM notified of Pt discharging today. Awaiting results for home O2 assessment.            Yandel Thomas, CM  [place]

## 2021-04-20 ENCOUNTER — PATIENT OUTREACH (OUTPATIENT)
Dept: CASE MANAGEMENT | Age: 81
End: 2021-04-20

## 2021-04-20 NOTE — PROGRESS NOTES
Care Transitions Initial Follow Up Call    Call within 2 business days of discharge: Yes     Patient: Desi Perez Patient :  MRN: [id number]    Last Discharge [address]       Complaint Diagnosis Description Type Department Provider    21 Shortness of Breath SOB (shortness of breath) . .. ED to Hosp-Admission (Discharged) (ADMIT) Tahmina Perea MD; [facility]. .. Was this an external facility discharge? No     Challenges to be reviewed by the provider   Additional needs identified to be addressed with provider yes  advance care planning- Patient does not have an ACP on file   Patient having difficulty with medication list verses what is on hand and what she is to take. Encouraged her to bring all her medication to appt with Dr Stephanie Kumar. Method of communication with provider : none    Discussed COVID-19 related testing which was available at this time. Test results were negative. Patient informed of results, if available? yes     Advance Care Planning:   Does patient have an Advance Directive: decision makers updated     Inpatient Readmission Risk score: Unplanned Readmit Risk Score: 15    Was this a readmission? no   Patient stated reason for the admission: faitgue and SOB    Patients top risk factors for readmission: medical condition-Pulm Edema   Interventions to address risk factors: Scheduled appointment with PCP-Dr Stephanie Kumar  at 2pm, Scheduled appointment with Specialist- Dr Carlos Morales , patient to make an appt with Dr Cee Sung for 2 weeks and Assessment and support for treatment adherence and medication management-Patient recieved new medication that are not ready at [facility], spouse to pick them up this afternoon after Dr Alondra Mayorga. Care Transition Nurse (CTN) contacted the patient by telephone to perform post hospital discharge assessment. Verified name and  with patient as identifiers.  Provided introduction to self, and explanation of the CTN role. CTN reviewed discharge instructions, medical action plan and red flags with patient who verbalized understanding. Were discharge instructions available to patient? yes. Reviewed appropriate site of care based on symptoms and resources available to patient including: PCP and Specialist. Patient given an opportunity to ask questions and does not have any further questions or concerns at this time. The patient agrees to contact the PCP office for questions related to their healthcare. Medication reconciliation was performed with family, who verbalizes understanding of administration of home medications. Referral to Pharm D needed: no     Home Health/Outpatient orders at discharge: none    Durable Medical Equipment ordered at discharge: None      Covid Risk Education    Patient has following risk factors of: heart failure and COPD. Education provided regarding infection prevention, and signs and symptoms of COVID-19 and when to seek medical attention with patient who verbalized understanding. Discussed exposure protocols and quarantine From CDC: Are you at higher risk for severe illness?  and given an opportunity for questions and concerns. The patient agrees to contact the COVID-19 hotline 622-226-0378 or PCP office for questions related to COVID-19. For more information on steps you can take to protect yourself, see CDC's How to Protect Yourself     Was patient discharged with a pulse oximeter? no     Discussed follow-up appointments. If no appointment was previously scheduled, appointment scheduling offered: yes Is follow up appointment scheduled within 7 days of discharge?  yes   [address] follow up appointment(s):   Future Appointments   Date Time Provider Lucía Vaughn   5/6/2021  1:15 PM Yoan Chaparro MD [facility]     [facility]):Patient has an appt with Dr Krishna Peres on 4/22/21 at 2pm.  Neuro Dr Alley Malone 4/25 and patient is to make an appt with Dr Roscoe Burkitt for 2 weeks    Plan for follow-up call in 10-14 days based on severity of symptoms and risk factors. Plan for next call: self management-SOB, cough, daily weights, leg edema and follow up appointment-PCP on 4/22 and Neuro 4/26 and if she made Cardiology F/U. CTN provided contact information for future needs. Goals Addressed                 This Visit's Progress     Attends follow-up appointments as directed. On track     04/20/21   -Patient has an appt with PCP Dr Bjorn Prince scheduled for 4/22/21 at 2pm.  -Patient states that she has an appt with Dr Xochilt Lomax on 4/25/21, patient also thought that this was Cardiology but he is Neurology.  -Patient to call and schedule an appt with Dr Foster Lopez for 2 weeks. Monitor status of these appt on next call. Flor Ly MSN, RN, CCM / Care Transition Nurse / [phone]              Knowledge and adherence of prescribed medication (ie. action, side effects, missed dose, etc.).        04/20/21   -Patient does not know what medication she is on, many medication are new to her and were sent to [facility] and they were not ready when  went to . Patient spouse has a 1pm Dr Farida Holm and after that will  medications. Patient is agreeable for me to call around 3 to complete Medication Rec with spouse. Flor Ly MSN, RN, CCM / Care Transition Nurse / [phone]     04/20/21 4:25 pm    -Patient  attempting to help with Medication reconciliation. Mr Marian Becerra has picked up the new Medication from [facility] and then patient gets on phone. Not real sure they know what they have on hand and what they have on the medication list verses what she is taking. Encouraged patient to take all her medication to her appt with Dr Bjorn Prince on 4/22 and also called Dr Shepherd's office to let them know about patient medication lists/what is on hand and what she is taking. Patient may need Home Health to go into home and set up pill box.       Monitor status of patient medications on next call. Asha KAUR, RN, CCM / Care Transition Nurse / [phone]        Understands red flags post discharge. 04/20/21   -Patient does have a scale, does not weigh daily.    -Patient states that she has not smoked in 3-4 days.  -Patient denies pedal edema, does have a dry cough and gets SOB on exertion. Wearing her O2 at 2/L via N/C. -Reviewed what a 3 lb in 1 day or 5 lb in 1 week weight gain means and what she should do if this occurs. -Patient with poor appetite-does not have any taste for the last month to 6 weeks. Monitor daily weight, S/S CHF, wearing her O2?, PO intake and review low sodium diet on next call.     Asha KAUR, RN, CCM / Care Transition Nurse / [phone]

## 2021-04-23 ENCOUNTER — PATIENT OUTREACH (OUTPATIENT)
Dept: CASE MANAGEMENT | Age: 81
End: 2021-04-23

## 2021-04-23 NOTE — PROGRESS NOTES
Goals Addressed                 This Visit's Progress     Attends follow-up appointments as directed. On track     04/20/21   -Patient has an appt with PCP Dr Eva Angulo scheduled for 4/22/21 at 2pm.  -Patient states that she has an appt with Dr Leda Bond on 4/25/21, patient also thought that this was Cardiology but he is Neurology.  -Patient to call and schedule an appt with Dr Ryan Banda for 2 weeks. Monitor status of these appt on next call. Marcus Luis MSN, RN, CCM / Care Transition Nurse / [phone]     04/23/21   Patient did attend her PCP appt with Dr Eva Angulo on 4/23, also has an appt with Dr Ryan Banda scheduled for 5/6. Monitor status of Dr Leda Bond and Dr Ryan Banda appt on next call. Marcusmiquel Smith MSN, RN, CCM / Care Transition Nurse / [phone]              Knowledge and adherence of prescribed medication (ie. action, side effects, missed dose, etc.). On track     04/20/21   -Patient does not know what medication she is on, many medication are new to her and were sent to [facility] and they were not ready when  went to . Patient spouse has a 1pm Dr Lisa Dawkins and after that will  medications. Patient is agreeable for me to call around 3 to complete Medication Rec with spouse. Marcus Luis MSN, RN, CCM / Care Transition Nurse / [phone]     04/20/21 4:25 pm    -Patient  attempting to help with Medication reconciliation. Mr Chivo White has picked up the new Medication from [facility] and then patient gets on phone. Not real sure they know what they have on hand and what they have on the medication list verses what she is taking. Encouraged patient to take all her medication to her appt with Dr Eva Angulo on 4/22 and also called Dr Shepherd's office to let them know about patient medication lists/what is on hand and what she is taking. Patient may need Home Health to go into home and set up pill box. Monitor status of patient medications on next call.     Kori Swanson Rajani KAUR, RN, CCM / Care Transition Nurse / [phone]     04/23/21   Patient was seen by Dr Judith Quinones yesterday and spouse states that he took away some of he the bottles. Not at a place where he could do med rec again. Do med recogcilation again on next call. Bee KAUR, RN, CCM / Care Transition Nurse / [phone]        Understands red flags post discharge. On track     04/20/21   -Patient does have a scale, does not weigh daily.    -Patient states that she has not smoked in 3-4 days.  -Patient denies pedal edema, does have a dry cough and gets SOB on exertion. Wearing her O2 at 2/L via N/C. -Reviewed what a 3 lb in 1 day or 5 lb in 1 week weight gain means and what she should do if this occurs. -Patient with poor appetite-does not have any taste for the last month to 6 weeks. Monitor daily weight, S/S CHF, wearing her O2?, PO intake and review low sodium diet on next call. Bee KAUR, RN, CCM / Care Transition Nurse / [phone]     04/23/21   Called and spoke briefly with spouse- denies SOB, or cough, patient is eating some better and is only wearing her O2 at HS now. Monitor weight, cough, SOB, wearing O2, low sodium diet on next call.     Bee KAUR, RN, [facility] / Care Transition Nurse / [phone]

## 2021-04-27 ENCOUNTER — PATIENT OUTREACH (OUTPATIENT)
Dept: CASE MANAGEMENT | Age: 81
End: 2021-04-27

## 2021-04-27 NOTE — PROGRESS NOTES
Physician Progress Note      Kumar Zacarias  CSN #:                  [id number]  :                       1940  ADMIT DATE:       2021 9:49 AM  DISCH DATE:        2021 7:20 PM  RESPONDING  PROVIDER #:        Neva Abdi MD          QUERY TEXT:    Dr. Chen Santana,      Pt admitted with Acute hypoxic respiratory failure due to Bilateral interstitial infiltrates likely pulmonary edema  and has new onset diastolic CHF documented. 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,  does not have a hx of CHF  Clinical Indicators: c/o Fatigue, loss of taste, NEGRO and cough, was found to have Acute hypoxic respiratory failure due to Bilateral interstitial infiltrates likely pulmonary edema and with new onset diastolic chf,  EF 32-60% in   bnp   Treatment: iv Lasix, iv Cardizem, po k, cont chf protocol with daily erin and I/0, Cards following      Thank you,  Brandon Jasso RN BS CCDS  [phone]  Options provided:  -- Acute Diastolic CHF/HFpEF  -- Chronic Diastolic CHF/HFpEF  -- Other - I will add my own diagnosis  -- Disagree - Not applicable / Not valid  -- Disagree - Clinically unable to determine / Unknown  -- Refer to Clinical Documentation Reviewer    PROVIDER RESPONSE TEXT:    This patient has chronic diastolic CHF/HFpEF.     Query created by: David Fernandez on 2021 2:57 PM      Electronically signed by:  Neva Abdi MD 2021 7:31 AM

## 2021-04-29 ENCOUNTER — PATIENT OUTREACH (OUTPATIENT)
Dept: CASE MANAGEMENT | Age: 81
End: 2021-04-29

## 2021-04-29 NOTE — PROGRESS NOTES
Goals Addressed                 This Visit's Progress     Understands red flags post discharge. 04/20/21   -Patient does have a scale, does not weigh daily.    -Patient states that she has not smoked in 3-4 days.  -Patient denies pedal edema, does have a dry cough and gets SOB on exertion. Wearing her O2 at 2/L via N/C. -Reviewed what a 3 lb in 1 day or 5 lb in 1 week weight gain means and what she should do if this occurs. -Patient with poor appetite-does not have any taste for the last month to 6 weeks. Monitor daily weight, S/S CHF, wearing her O2?, PO intake and review low sodium diet on next call. Nikhil KAUR, RN, CCM / Care Transition Nurse / [phone]     04/23/21   Called and spoke briefly with spouse- denies SOB, or cough, patient is eating some better and is only wearing her O2 at HS now. Monitor weight, cough, SOB, wearing O2, low sodium diet on next call. Nikhil KAUR RN, CCM / Care Transition Nurse / [phone]     04/27/21   Incoming call from patient, she states that she has not had a BM since 4/24/21. Patient is not on stool softener but does have MOM ordered PRN  Suggested patient try prune juice and her MOM. Monitor status of Constipation on next call. Nikhil KAUR, RN, CCM / Care Transition Nurse / [phone]     04/29/21 10:56am  Had incoming message from patient, called her back and spoke to Mr Ramona Mccrackenosevelt, he does not know why she called, she did have a BM. He is on the way home and will find out what she needs and will call back if needed.       Nikhil KAUR, RN, CCM / Care Transition Nurse / [phone]

## 2021-05-04 ENCOUNTER — PATIENT OUTREACH (OUTPATIENT)
Dept: CASE MANAGEMENT | Age: 81
End: 2021-05-04

## 2021-05-04 NOTE — PROGRESS NOTES
Goals      Attends follow-up appointments as directed. 04/20/21   -Patient has an appt with PCP Dr Yesi Liang scheduled for 4/22/21 at 2pm.  -Patient states that she has an appt with Dr Elizabeth Navarrete on 4/25/21, patient also thought that this was Cardiology but he is Neurology.  -Patient to call and schedule an appt with Dr Malaika Griffiths for 2 weeks. Monitor status of these appt on next call. Azra KAUR, RN, CCM / Care Transition Nurse / [phone]     04/23/21   Patient did attend her PCP appt with Dr Yesi Liang on 4/23, also has an appt with Dr Malaika Griffiths scheduled for 5/6. Monitor status of Dr Elizabeth Navarrete and Dr Malaika Griffiths appt on next call. Azra KAUR, RN, CCM / Care Transition Nurse / [phone]     05/04/21     -Patient has an appt with Cardiology on 5/6 and Neurology 5/25 at 11am.      Monitor status of these appt on next call. Azra KAUR, RN, CCM / Care Transition Nurse / [phone]        Understands red flags post discharge. 04/20/21   -Patient does have a scale, does not weigh daily.    -Patient states that she has not smoked in 3-4 days.  -Patient denies pedal edema, does have a dry cough and gets SOB on exertion. Wearing her O2 at 2/L via N/C. -Reviewed what a 3 lb in 1 day or 5 lb in 1 week weight gain means and what she should do if this occurs. -Patient with poor appetite-does not have any taste for the last month to 6 weeks. Monitor daily weight, S/S CHF, wearing her O2?, PO intake and review low sodium diet on next call. Azra KAUR, RN, CCM / Care Transition Nurse / [phone]     04/23/21   Called and spoke briefly with spouse- denies SOB, or cough, patient is eating some better and is only wearing her O2 at HS now. Monitor weight, cough, SOB, wearing O2, low sodium diet on next call.     Azra KAUR, RN, CCM / Care Transition Nurse / [phone]     04/27/21   Incoming call from patient, she states that she has not had a BM since 4/24/21. Patient is not on stool softener but does have MOM ordered PRN  Suggested patient try prune juice and her MOM. Monitor status of Constipation on next call. Jose Miguel KAUR, RN, CCM / Care Transition Nurse / [phone]     04/29/21 10:56am  Had incoming message from patient, called her back and spoke to Mr Sophia Kirk, he does not know why she called, she did have a BM. He is on the way home and will find out what she needs and will call back if needed. Jose Miguel KAUR, RN, CCM / Care Transition Nurse / [phone]     05/04/21   Patient denies SOB, is only wearing her O2  @2/L at HS now. Patient denies cough, did weigh today and was 116 lbs. Patient states that she has no energy she is eating more but does not have any taste. Patient states that her constipation is better. Monitor weight, SOB, 02 use and activity tolerance on next call.     Jose Miguel KAUR, RN, CCM / Care Transition Nurse / [phone]

## 2021-05-06 ENCOUNTER — OFFICE VISIT (OUTPATIENT)
Dept: CARDIOLOGY CLINIC | Age: 81
End: 2021-05-06
Payer: MEDICARE

## 2021-05-06 VITALS
RESPIRATION RATE: 18 BRPM | HEART RATE: 58 BPM | DIASTOLIC BLOOD PRESSURE: 64 MMHG | WEIGHT: 117.2 LBS | BODY MASS INDEX: 21.57 KG/M2 | HEIGHT: 62 IN | SYSTOLIC BLOOD PRESSURE: 142 MMHG | OXYGEN SATURATION: 96 %

## 2021-05-06 DIAGNOSIS — R53.1 GENERALIZED WEAKNESS: ICD-10-CM

## 2021-05-06 DIAGNOSIS — Z86.79 S/P RF ABLATION OPERATION FOR ARRHYTHMIA: ICD-10-CM

## 2021-05-06 DIAGNOSIS — I48.0 PAF (PAROXYSMAL ATRIAL FIBRILLATION) (HCC): Primary | ICD-10-CM

## 2021-05-06 DIAGNOSIS — Z98.890 S/P RF ABLATION OPERATION FOR ARRHYTHMIA: ICD-10-CM

## 2021-05-06 DIAGNOSIS — I38 VALVULAR HEART DISEASE: ICD-10-CM

## 2021-05-06 DIAGNOSIS — I35.1 AORTIC VALVE INSUFFICIENCY, ETIOLOGY OF CARDIAC VALVE DISEASE UNSPECIFIED: ICD-10-CM

## 2021-05-06 PROCEDURE — G8536 NO DOC ELDER MAL SCRN: HCPCS | Performed by: INTERNAL MEDICINE

## 2021-05-06 PROCEDURE — 1111F DSCHRG MED/CURRENT MED MERGE: CPT | Performed by: INTERNAL MEDICINE

## 2021-05-06 PROCEDURE — G8420 CALC BMI NORM PARAMETERS: HCPCS | Performed by: INTERNAL MEDICINE

## 2021-05-06 PROCEDURE — G8427 DOCREV CUR MEDS BY ELIG CLIN: HCPCS | Performed by: INTERNAL MEDICINE

## 2021-05-06 PROCEDURE — G8399 PT W/DXA RESULTS DOCUMENT: HCPCS | Performed by: INTERNAL MEDICINE

## 2021-05-06 PROCEDURE — 1101F PT FALLS ASSESS-DOCD LE1/YR: CPT | Performed by: INTERNAL MEDICINE

## 2021-05-06 PROCEDURE — 99214 OFFICE O/P EST MOD 30 MIN: CPT | Performed by: INTERNAL MEDICINE

## 2021-05-06 PROCEDURE — G9717 DOC PT DX DEP/BP F/U NT REQ: HCPCS | Performed by: INTERNAL MEDICINE

## 2021-05-06 PROCEDURE — G0463 HOSPITAL OUTPT CLINIC VISIT: HCPCS | Performed by: INTERNAL MEDICINE

## 2021-05-06 PROCEDURE — 1090F PRES/ABSN URINE INCON ASSESS: CPT | Performed by: INTERNAL MEDICINE

## 2021-05-06 NOTE — PROGRESS NOTES
Chief Complaint   Patient presents with    Other     Pulmonary Edema     Visit Vitals  BP (!) 142/64 (BP 1 Location: Left arm, BP Patient Position: Sitting, BP Cuff Size: Small adult)   Pulse (!) 58   Resp 18   Ht 5' 2\" (1.575 m)   Wt 117 lb 3.2 oz (53.2 kg)   SpO2 96%   BMI 21.44 kg/m²     1. Have you been to the ER, urgent care clinic since your last visit? Hospitalized since your last visit?04/17/21    2. Have you seen or consulted any other health care providers outside of the [address] [place] [address] since your last visit? Include any pap smears or colon screening.  yes

## 2021-05-06 NOTE — PROGRESS NOTES
[address], [address] [facility]  [phone]     Subjective:      Giulia Davalos is a 80 y.o. female is here for hospital follow up. PMH pSVT s/p RF ablation in 2015, Parkinson's disease, hypothyroidism admitted at [facility] 4/17-4/19/2021 for sob/hypoxic respiratory failure, new onset AF. Today, she states breathing better, c/o having to use bathroom more. Has oxygen at night. Denies any feeling of heart racing. Baseline generalized weakness / unsteady gait. The patient denies chest pain, orthopnea, PND, LE edema, palpitations, syncope, or presyncope. Echo 4/18/2021  · LV: Estimated LVEF is 50 - 55%. Normal cavity size and wall thickness. Low normal systolic function. · AV: Mild to moderate aortic valve regurgitation is present. · MV: Moderate to severe mitral valve regurgitation is present. · TV: Moderate to severe tricuspid valve regurgitation is present. · Pericardium: There is left pleural effusion. · LA: Dilated left atrium. · RA: Dilated right atrium. · PA: Moderate pulmonary hypertension. Pulmonary arterial systolic pressure is 50 mmHg.      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    Depression-anxiety 12/11/2014    Fatigue 12/11/2014    Valvular heart disease--mod to severe MR and TR 09/11/2014    DJD (degenerative joint disease)--hips--painful 09/11/2014    Palpitation 09/10/2014    Chronic insomnia 08/26/2014    COPD (chronic obstructive pulmonary disease) ([address] 08/26/2014    Breast calcification seen on mammogram     Tobacco use disorder 02/03/2014    Dyslipidemia 02/03/2014    Chronic airway obstruction, not elsewhere classified 02/03/2014    NEGRO (dyspnea on exertion) 02/03/2014    Hypothyroid 11/18/2013    Vitamin D deficiency 11/18/2013    Mitral valve disorders(424.0) 06/28/2012    Headache(784.0) 10/06/2011    Psoriasis 08/01/2011    Intractable ophthalmic migraine     Dry eye syndrome     Anxiety state, unspecified     GERD (gastroesophageal reflux disease)     Hemorrhoids, internal 06/01/2003      Kita Villafuerte MD  Past Medical History:   Diagnosis Date    Allergy, unspecified not elsewhere classified     Anxiety state, unspecified     Breast calcification seen on mammogram 2011    bilaterally.  Bursitis of hip     with Tendonitis. Dr. Lakeshia Cutler. Dr. Steve Razo. Dr. Argenis Ahumada.  Cataract 2013    bilaterally. Dr. Gabe Hinds.  COPD     Dry eye syndrome     Dr. Luis Reynoso 2002    GERD (gastroesophageal reflux disease)     H. pylori positive Txd. Dr. Zachary Damian    Heart murmur 12/2011    Moderate MR. Moderate TR. Moderate AoR. Normal Stress Test.  Dr. Geoffrey Arauz.  Hemorrhoids, internal 06/2003    Dr. Sarah Byers Hiatal hernia     Dr. Sarah Byers Hyperthyroidism 1980s    Txd with APARICIO    IBS (irritable bowel syndrome) 1958    Intractable ophthalmic migraine     Lactose intolerance     Lyme disease 09/2011    Txd with Doxy x 21. Dr. Wallace Net Osteopenia 12/2002, 09/2004,8/2012    Dr. Bryant Bautista.  Other and unspecified hyperlipidemia     Parkinson disease ([address]     Paroxysmal SVT (supraventricular tachycardia) ([address] 09/01/14    Dr. Kary Roy. Dr. Seema Cotto.    Polyuria 2013    Psoriasis 08/2011    Left ear. Dr. Kevin Borjas RBBB (right bundle branch block) 12/2011    Dr. Kary Roy. Normal Stress Test 12/2011.     SBO (small bowel obstruction) ([address] 1970s    Unspecified hypothyroidism 1980s    Valvular heart disease 2014    mild-mod MR.  TR.  AI. Dr. Elijah KAUR deficiency 06/2011      Past Surgical History:   Procedure Laterality Date    COLONOSCOPY  06/2003    Dr. Severa Coffee. due q 10 yrs    HX BREAST BIOPSY  2011    Right. benign. [facility].  HX CATARACT REMOVAL Bilateral 10/15/2013 (Right), 11/06/2013 (Left)    Dr. Ady Moody  [id number]    Dr. Diomedes Mario MD    HX LAP CHOLECYSTECTOMY      HX ORTHOPAEDIC      some type of foot surgery 30 yrs ago    HX ARCELIA AND BSO  1973    due to fibroids then SBO    HX TONSILLECTOMY  1956    [address] FLX W/RMVL OF TUMOR POLYP LESION SNARE TQ  3/14/2013         ND COLSC FLX W/RMVL OF TUMOR POLYP LESION SNARE TQ  3/14/2013         ND LAP,CHOLECYSTECTOMY  2002    Dr. Jens Hinojosa.      Allergies   Allergen Reactions    Azithromycin Nausea Only     Loss of appetite, jittery    Fosamax [Alendronate] Other (comments)     Ulcers    Zocor [Simvastatin] Myalgia     Able to take 10 mg daily only      Family History   Problem Relation Age of Onset    Heart Disease Mother         chf    Hypertension Mother     Arthritis-osteo Mother     Stroke Mother     Thyroid Disease Sister     Diabetes Maternal Grandmother     Kidney Disease Other         tumor present      Social History     Socioeconomic History    Marital status:      Spouse name: Not on file    Number of children: Not on file    Years of education: Not on file    Highest education level: Not on file   Occupational History    Not on file   Social Needs    Financial resource strain: Not on file    Food insecurity     Worry: Not on file     Inability: Not on file    Transportation needs     Medical: Not on file     Non-medical: Not on file   Tobacco Use    Smoking status: Current Every Day Smoker     Packs/day: 0.30     Years: 50.00     Pack years: 15.00     Types: Cigarettes    Smokeless tobacco: Never Used    Tobacco comment: 2 cigarettes daily   Substance and Sexual Activity    Alcohol use: No    Drug use: No    Sexual activity: Not Currently   Lifestyle    Physical activity     Days per week: Not on file     Minutes per session: Not on file    Stress: Not on file   Relationships    Social connections     Talks on phone: Not on file     Gets together: Not on file     Attends Voodoo service: Not on file     Active member of club or organization: Not on file     Attends meetings of clubs or organizations: Not on file     Relationship status: Not on file    Intimate partner violence     Fear of current or ex partner: Not on file     Emotionally abused: Not on file     Physically abused: Not on file     Forced sexual activity: Not on file   Other Topics Concern    Not on file   Social History Narrative    Not on file      Current Outpatient Medications   Medication Sig    Oxygen 2 liters at night    dilTIAZem ER (CARDIZEM CD) 120 mg capsule Take 1 Cap by mouth daily.  furosemide (Lasix) 40 mg tablet Take 1 Tab by mouth daily.  metoprolol tartrate (LOPRESSOR) 25 mg tablet Take 0.5 Tabs by mouth every twelve (12) hours.  potassium chloride (K-DUR, KLOR-CON) 20 mEq tablet Take 1 Tab by mouth daily.  levothyroxine (SYNTHROID) 100 mcg tablet Take 100 mcg by mouth Daily (before breakfast).  donepeziL (Aricept) 10 mg tablet Take 10 mg by mouth daily.  fludrocortisone (FLORINEF) 0.1 mg tablet Take 0.1 mg by mouth daily.  carbidopa-levodopa ER (Rytary) 36. mg per capsule Take 1 Cap by mouth two (2) times a day.  sertraline (ZOLOFT) 50 mg tablet Take 50 mg by mouth daily.  montelukast (SINGULAIR) 10 mg tablet TAKE ONE TABLET BY MOUTH ONE TIME DAILY     calcium-cholecalciferol, d3, (CALCIUM 600 + D) 600-125 mg-unit tab Take  by mouth. Takes 1/2 pill daily    aspirin delayed-release 81 mg tablet Take 81 mg by mouth daily.  1 tab Taking 3 times a week    omega-3 fatty acids-vitamin e (FISH OIL) 1,000 mg Cap Take 1,000 mg by mouth daily.  fluticasone (FLONASE) 50 mcg/actuation nasal spray USE TWO SPRAYS IN EACH NOSTRIL DAILY     VENTOLIN HFA 90 mcg/actuation inhaler Inhale two puffs by mouth every 4 to 6 hours as needed shortness of breath     No current facility-administered medications for this visit. Review of Symptoms:  11 systems reviewed, negative other than as stated in the HPI    Physical ExamPhysical Exam:    Vitals:    05/06/21 1313   BP: (!) 142/64   Pulse: (!) 58   Resp: 18   SpO2: 96%   Weight: 117 lb 3.2 oz (53.2 kg)   Height: 5' 2\" (1.575 m)     Body mass index is 21.44 kg/m². General PE  Gen:  NAD  Mental Status - Alert. General Appearance - Not in acute distress. HEENT:  PERRL, no carotid bruits or JVD  Chest and Lung Exam   Inspection: Accessory muscles - No use of accessory muscles in breathing. Auscultation:   Breath sounds: - Normal.   Cardiovascular   Inspection: Jugular vein - Bilateral - Inspection Normal.   Palpation/Percussion:   Apical Impulse: - Normal.   Auscultation: Rhythm - IRRegular. Heart Sounds - S1 WNL and S2 WNL. No S3 or S4. Murmurs & Other Heart Sounds: Auscultation of the heart reveals - No Murmurs. Peripheral Vascular   Upper Extremity: Inspection - Bilateral - No Cyanotic nailbeds or Digital clubbing. Lower Extremity:   Palpation: Edema - Bilateral - No edema. Abdomen:   Soft, non-tender, bowel sounds are active.   Neuro: A&O times1, CN and motor grossly WNL    Labs:   Lab Results   Component Value Date/Time    Cholesterol, total 181 08/26/2014 09:55 AM    Cholesterol, total 208 (H) 07/03/2013 09:33 AM    Cholesterol, total 207 (H) 11/28/2012 10:03 AM    Cholesterol, total 215 (H) 11/22/2011 10:00 AM    Cholesterol, total 183 06/02/2011 09:25 AM    Cholesterol, Total 225 (H) 07/03/2013 09:33 AM    HDL Cholesterol 63 08/26/2014 09:55 AM    HDL Cholesterol 66 07/03/2013 09:33 AM    HDL Cholesterol 68 11/28/2012 10:03 AM    HDL Cholesterol 63 2011 10:00 AM    HDL Cholesterol 63 2011 09:25 AM    LDL, calculated 90 2014 09:55 AM    LDL, calculated 118 (H) 2013 09:33 AM    LDL, calculated 111 (H) 2012 10:03 AM    LDL, calculated 123 (H) 2011 10:00 AM    LDL, calculated 90 2011 09:25 AM    Triglyceride 140 2014 09:55 AM    Triglyceride 121 2013 09:33 AM    Triglyceride 141 2012 10:03 AM    Triglyceride 143 2011 10:00 AM    Triglyceride 151 (H) 2011 09:25 AM    CHOL/HDL Ratio 3.2 2010 08:16 AM     Lab Results   Component Value Date/Time     2014 08:45 PM     Lab Results   Component Value Date/Time    Sodium 144 2021 03:25 AM    Potassium 3.5 2021 03:25 AM    Chloride 110 (H) 2021 03:25 AM    CO2 33 (H) 2021 03:25 AM    Anion gap 1 (L) 2021 03:25 AM    Glucose 92 2021 03:25 AM    BUN 15 2021 03:25 AM    Creatinine 0.89 2021 03:25 AM    BUN/Creatinine ratio 17 2021 03:25 AM    GFR est AA >60 2021 03:25 AM    GFR est non-AA >60 2021 03:25 AM    Calcium 8.7 2021 03:25 AM    Bilirubin, total 0.8 2021 10:39 AM    Alk. phosphatase 102 2021 10:39 AM    Protein, total 7.4 2021 10:39 AM    Albumin 3.1 (L) 2021 10:39 AM    Globulin 4.3 (H) 2021 10:39 AM    A-G Ratio 0.7 (L) 2021 10:39 AM    ALT (SGPT) 20 2021 10:39 AM       EKG:         Assessment:     Assessment:      1. PAF (paroxysmal atrial fibrillation) ([facility]    2. S/P RF ablation operation for SVT arrhythmia     3. Aortic valve insufficiency, etiology of cardiac valve disease unspecified    4. Valvular heart disease--mod to severe MR and TR    5. Generalized weakness        Orders Placed This Encounter    AMB POC EKG ROUTINE W/ 12 LEADS, INTER & REP     Order Specific Question:   Reason for Exam:     Answer:   screening    Oxygen     Si liters at night        Plan:     1. PAF: h/o SVT ablation. Rate controlled. Continue metoprolol tartrate 12.5 mg BID and cardizem 120 mg daily. Not a good candidate for [facility] d/t dementia/fall risk. On ASA  2. Normal EF, Mod to severe MR and TR per echo 4/2021: Continue conservative approach. On lasix 40 mg daily. 3. HTN: Controlled with current therapy  4. Parkinson's disease: Followed by neuro  5. COPD: now on oxygen therapy if needed, uses at night    Continue current care and f/u as needed    Olamide Plunkett NP       Patient seen and examined by me with nurse practitioner. I personally performed all components of the history, physical, and medical decision making and agree with the assessment and plan as noted. In SR today. Continue current meds. No a good candidate for long term oral AC. D/w family again. No further invasive cardiac work up or intervention. Will follow with me as needed. F/u with PCP.      Bryn Cordova MD

## 2021-05-13 ENCOUNTER — PATIENT OUTREACH (OUTPATIENT)
Dept: CASE MANAGEMENT | Age: 81
End: 2021-05-13

## 2021-05-13 NOTE — PROGRESS NOTES
Goals      Attends follow-up appointments as directed. 04/20/21   -Patient has an appt with PCP Dr Alycia Early scheduled for 4/22/21 at 2pm.  -Patient states that she has an appt with Dr Justin Moseley on 4/25/21, patient also thought that this was Cardiology but he is Neurology.  -Patient to call and schedule an appt with Dr Sofie Burns for 2 weeks. Monitor status of these appt on next call. Rachelle KAUR, RN, [facility] / Care Transition Nurse / [phone]     04/23/21   Patient did attend her PCP appt with Dr Alycia Early on 4/23, also has an appt with Dr Sofie Burns scheduled for 5/6. Monitor status of Dr Justin Moseley and Dr Sofie Burns appt on next call. Rachelle KAUR, RN, [facility] / Care Transition Nurse / [phone]     05/04/21     -Patient has an appt with Cardiology on 5/6 and Neurology 5/25 at 11am.      Monitor status of these appt on next call. Rachelle KAUR, RN, [facility] / Care Transition Nurse / [phone]     05/13/21     Patient did attend her Cardiology as scheduled and still has her Neuro appt on 5/25/21. Monitor status of Neurology appt on next call. Rachelle KAUR, RN, [facility] / Care Transition Nurse / [phone]          Understands red flags post discharge. 04/20/21   -Patient does have a scale, does not weigh daily.    -Patient states that she has not smoked in 3-4 days.  -Patient denies pedal edema, does have a dry cough and gets SOB on exertion. Wearing her O2 at 2/L via N/C. -Reviewed what a 3 lb in 1 day or 5 lb in 1 week weight gain means and what she should do if this occurs. -Patient with poor appetite-does not have any taste for the last month to 6 weeks. Monitor daily weight, S/S CHF, wearing her O2?, PO intake and review low sodium diet on next call. Rachelle KAUR, RN, [facility] / Care Transition Nurse / [phone]     04/23/21   Called and spoke briefly with spouse- denies SOB, or cough, patient is eating some better and is only wearing her O2 at HS now. Monitor weight, cough, SOB, wearing O2, low sodium diet on next call. Minal KAUR, RN, [facility] / Care Transition Nurse / [phone]     04/27/21   Incoming call from patient, she states that she has not had a BM since 4/24/21. Patient is not on stool softener but does have MOM ordered PRN  Suggested patient try prune juice and her MOM. Monitor status of Constipation on next call. Minal KAUR, RN, [facility] / Care Transition Nurse / [phone]     04/29/21 10:56am  Had incoming message from patient, called her back and spoke to Mr Teetee Prajapati, he does not know why she called, she did have a BM. He is on the way home and will find out what she needs and will call back if needed. Minal KAUR, RN, [facility] / Care Transition Nurse / [phone]     05/04/21   Patient denies SOB, is only wearing her O2  @2/L at HS now. Patient denies cough, did weigh today and was 116 lbs. Patient states that she has no energy she is eating more but does not have any taste. Patient states that her constipation is better. Monitor weight, SOB, 02 use and activity tolerance on next call. Minal KAUR RN, [facility] / Care Transition Nurse / [phone]     05/13/21   Patient states that her appetite is some better, still can't taste anything. Today weight was 116 lb. Denies SOB, still wears O2 at HS. Patient states that her constipation is getting better. Monitor weight, SOB, appetite-taste and SOB on next call.       Minal KAUR, RN, [facility] / Care Transition Nurse / [phone]

## 2021-05-19 ENCOUNTER — PATIENT OUTREACH (OUTPATIENT)
Dept: CASE MANAGEMENT | Age: 81
End: 2021-05-19

## 2021-05-19 NOTE — PROGRESS NOTES
Patient has graduated from the Transitions of Care Coordination  program on 5/19/21. Patient/family has the ability to self-manage at this time Care management goals have been completed. Patient was not referred to the [facility] team for further management. Goals Addressed                 This Visit's Progress     COMPLETED: Attends follow-up appointments as directed. 04/20/21   -Patient has an appt with PCP Dr Alma Tai scheduled for 4/22/21 at 2pm.  -Patient states that she has an appt with Dr Lamin Steele on 4/25/21, patient also thought that this was Cardiology but he is Neurology.  -Patient to call and schedule an appt with Dr Dieudonne Parada for 2 weeks. Monitor status of these appt on next call. Kim KAUR, RN, [facility] / Care Transition Nurse / [phone]     04/23/21   Patient did attend her PCP appt with Dr Alma Tai on 4/23, also has an appt with Dr Dieudonne Parada scheduled for 5/6. Monitor status of Dr Lamin Steele and Dr Dieudonne Parada appt on next call. Kim KAUR, RN, [facility] / Care Transition Nurse / [phone]     05/04/21     -Patient has an appt with Cardiology on 5/6 and Neurology 5/25 at 11am.      Monitor status of these appt on next call. Kim KAUR, RN, [facility] / Care Transition Nurse / [phone]     05/13/21     Patient did attend her Cardiology as scheduled and still has her Neuro appt on 5/25/21. Monitor status of Neurology appt on next call. Kim KAUR, RN, [facility] / Care Transition Nurse / [phone]     05/19/21   Patient did attend her 5/6 Cardiology appt, still has her 5/26 Neuro appt and her  is driving her to that. Kim KAUR, RN, [facility] / Care Transition Nurse / [phone]          COMPLETED: Understands red flags post discharge. 04/20/21   -Patient does have a scale, does not weigh daily.    -Patient states that she has not smoked in 3-4 days.  -Patient denies pedal edema, does have a dry cough and gets SOB on exertion.   Wearing her O2 at 2/L via N/C. -Reviewed what a 3 lb in 1 day or 5 lb in 1 week weight gain means and what she should do if this occurs. -Patient with poor appetite-does not have any taste for the last month to 6 weeks. Monitor daily weight, S/S CHF, wearing her O2?, PO intake and review low sodium diet on next call. Rissa KAUR, RN, [facility] / Care Transition Nurse / [phone]     04/23/21   Called and spoke briefly with spouse- denies SOB, or cough, patient is eating some better and is only wearing her O2 at HS now. Monitor weight, cough, SOB, wearing O2, low sodium diet on next call. Rissa KAUR RN, [facility] / Care Transition Nurse / [phone]     04/27/21   Incoming call from patient, she states that she has not had a BM since 4/24/21. Patient is not on stool softener but does have MOM ordered PRN  Suggested patient try prune juice and her MOM. Monitor status of Constipation on next call. Rissa KAUR RN, [facility] / Care Transition Nurse / [phone]     04/29/21 10:56am  Had incoming message from patient, called her back and spoke to Mr Roberto Meza, he does not know why she called, she did have a BM. He is on the way home and will find out what she needs and will call back if needed. Rissa KAUR RN, [facility] / Care Transition Nurse / [phone]     05/04/21   Patient denies SOB, is only wearing her O2  @2/L at HS now. Patient denies cough, did weigh today and was 116 lbs. Patient states that she has no energy she is eating more but does not have any taste. Patient states that her constipation is better. Monitor weight, SOB, 02 use and activity tolerance on next call. Rissa KAUR RN, [facility] / Care Transition Nurse / [phone]     05/13/21   Patient states that her appetite is some better, still can't taste anything. Today weight was 116 lb. Denies SOB, still wears O2 at HS. Patient states that her constipation is getting better.     Monitor weight, SOB, appetite-taste and SOB on next call. Angel KAUR, RN, [facility] / Care Transition Nurse / [phone]     05/19/21   Patient states her weight is staying around 166-117 lbs. Patient denies any further SOB, is just wearing O2 at HS. Patient states her appetite is better but she still can't taste anything. Patient states that her constipation is off and on. Angel KAUR, RN, [facility] / Care Transition Nurse / [phone]                       Patient has Care Transition Nurse's contact information for any further questions, concerns, or needs. Patients upcoming visits:  No future appointments.

## 2022-03-18 PROBLEM — J96.01 ACUTE RESPIRATORY FAILURE WITH HYPOXIA (HCC): Status: ACTIVE | Noted: 2021-04-17

## 2022-03-19 PROBLEM — Z98.890 S/P RF ABLATION OPERATION FOR ARRHYTHMIA: Status: ACTIVE | Noted: 2018-06-05

## 2022-03-19 PROBLEM — Z86.79 S/P RF ABLATION OPERATION FOR ARRHYTHMIA: Status: ACTIVE | Noted: 2018-06-05

## 2022-03-19 PROBLEM — R53.1 GENERALIZED WEAKNESS: Status: ACTIVE | Noted: 2021-04-17

## 2022-03-19 PROBLEM — J81.1 PULMONARY EDEMA: Status: ACTIVE | Noted: 2021-04-17

## 2022-03-19 PROBLEM — I48.0 PAF (PAROXYSMAL ATRIAL FIBRILLATION) (HCC): Status: ACTIVE | Noted: 2021-05-06

## 2022-03-19 PROBLEM — I48.91 NEW ONSET ATRIAL FIBRILLATION (HCC): Status: ACTIVE | Noted: 2021-04-17

## 2023-05-30 RX ORDER — LEVODOPA AND CARBIDOPA 145; 36.25 MG/1; MG/1
1 CAPSULE, EXTENDED RELEASE ORAL 2 TIMES DAILY
COMMUNITY

## 2023-05-30 RX ORDER — FLUDROCORTISONE ACETATE 0.1 MG/1
TABLET ORAL DAILY
COMMUNITY

## 2023-05-30 RX ORDER — FUROSEMIDE 40 MG/1
TABLET ORAL DAILY
COMMUNITY
Start: 2021-04-19

## 2023-05-30 RX ORDER — DONEPEZIL HYDROCHLORIDE 10 MG/1
10 TABLET, FILM COATED ORAL DAILY
COMMUNITY

## 2023-05-30 RX ORDER — MONTELUKAST SODIUM 10 MG/1
1 TABLET ORAL DAILY
COMMUNITY
Start: 2015-05-26

## 2023-05-30 RX ORDER — DILTIAZEM HYDROCHLORIDE 120 MG/1
CAPSULE, EXTENDED RELEASE ORAL DAILY
COMMUNITY
Start: 2021-04-20

## 2023-05-30 RX ORDER — POTASSIUM CHLORIDE 20 MEQ/1
TABLET, EXTENDED RELEASE ORAL DAILY
COMMUNITY
Start: 2021-04-19

## 2023-05-30 RX ORDER — ASPIRIN 81 MG/1
TABLET ORAL DAILY
COMMUNITY

## 2023-05-30 RX ORDER — LEVOTHYROXINE SODIUM 0.1 MG/1
TABLET ORAL
COMMUNITY

## 2023-05-30 RX ORDER — FLUTICASONE PROPIONATE 50 MCG
SPRAY, SUSPENSION (ML) NASAL
COMMUNITY
Start: 2015-04-23

## 2023-05-30 RX ORDER — ALBUTEROL SULFATE 90 UG/1
AEROSOL, METERED RESPIRATORY (INHALATION)
COMMUNITY
Start: 2015-03-05

## 2023-10-05 ENCOUNTER — HOSPITAL ENCOUNTER (INPATIENT)
Facility: HOSPITAL | Age: 83
LOS: 4 days | Discharge: HOME | DRG: 871 | End: 2023-10-10
Attending: EMERGENCY MEDICINE | Admitting: INTERNAL MEDICINE
Payer: MEDICARE

## 2023-10-05 DIAGNOSIS — G20.B2 PARKINSON'S DISEASE WITH DYSKINESIA AND FLUCTUATING MANIFESTATIONS: ICD-10-CM

## 2023-10-05 DIAGNOSIS — G93.40 ENCEPHALOPATHY ACUTE: ICD-10-CM

## 2023-10-05 DIAGNOSIS — J81.0 ACUTE PULMONARY EDEMA (HCC): Primary | ICD-10-CM

## 2023-10-05 DIAGNOSIS — N30.00 ACUTE CYSTITIS WITHOUT HEMATURIA: ICD-10-CM

## 2023-10-05 LAB
ALBUMIN SERPL-MCNC: 3.4 G/DL (ref 3.5–5)
ALBUMIN/GLOB SERPL: 0.9 (ref 1.1–2.2)
ALP SERPL-CCNC: 88 U/L (ref 45–117)
ALT SERPL-CCNC: 10 U/L (ref 12–78)
ANION GAP SERPL CALC-SCNC: 6 MMOL/L (ref 5–15)
APPEARANCE UR: ABNORMAL
AST SERPL-CCNC: 13 U/L (ref 15–37)
BACTERIA URNS QL MICRO: ABNORMAL /HPF
BASOPHILS # BLD: 0.1 K/UL (ref 0–0.1)
BASOPHILS NFR BLD: 1 % (ref 0–1)
BILIRUB SERPL-MCNC: 0.3 MG/DL (ref 0.2–1)
BILIRUB UR QL: NEGATIVE
BUN SERPL-MCNC: 20 MG/DL (ref 6–20)
BUN/CREAT SERPL: 19 (ref 12–20)
CALCIUM SERPL-MCNC: 9.2 MG/DL (ref 8.5–10.1)
CHLORIDE SERPL-SCNC: 110 MMOL/L (ref 97–108)
CO2 SERPL-SCNC: 25 MMOL/L (ref 21–32)
COLOR UR: ABNORMAL
COMMENT:: NORMAL
CREAT SERPL-MCNC: 1.05 MG/DL (ref 0.55–1.02)
DIFFERENTIAL METHOD BLD: NORMAL
EOSINOPHIL # BLD: 0.2 K/UL (ref 0–0.4)
EOSINOPHIL NFR BLD: 2 % (ref 0–7)
EPITH CASTS URNS QL MICRO: ABNORMAL /LPF
ERYTHROCYTE [DISTWIDTH] IN BLOOD BY AUTOMATED COUNT: 13 % (ref 11.5–14.5)
FLUAV AG NPH QL IA: NEGATIVE
FLUBV AG NOSE QL IA: NEGATIVE
GLOBULIN SER CALC-MCNC: 3.9 G/DL (ref 2–4)
GLUCOSE SERPL-MCNC: 100 MG/DL (ref 65–100)
GLUCOSE UR STRIP.AUTO-MCNC: NEGATIVE MG/DL
HCT VFR BLD AUTO: 42.9 % (ref 35–47)
HGB BLD-MCNC: 14.4 G/DL (ref 11.5–16)
HGB UR QL STRIP: NEGATIVE
IMM GRANULOCYTES # BLD AUTO: 0 K/UL (ref 0–0.04)
IMM GRANULOCYTES NFR BLD AUTO: 0 % (ref 0–0.5)
KETONES UR QL STRIP.AUTO: ABNORMAL MG/DL
LACTATE BLD-SCNC: 0.81 MMOL/L (ref 0.4–2)
LEUKOCYTE ESTERASE UR QL STRIP.AUTO: ABNORMAL
LYMPHOCYTES # BLD: 1.8 K/UL (ref 0.8–3.5)
LYMPHOCYTES NFR BLD: 18 % (ref 12–49)
MCH RBC QN AUTO: 32 PG (ref 26–34)
MCHC RBC AUTO-ENTMCNC: 33.6 G/DL (ref 30–36.5)
MCV RBC AUTO: 95.3 FL (ref 80–99)
MONOCYTES # BLD: 0.7 K/UL (ref 0–1)
MONOCYTES NFR BLD: 7 % (ref 5–13)
MUCOUS THREADS URNS QL MICRO: ABNORMAL /LPF
NEUTS SEG # BLD: 7.1 K/UL (ref 1.8–8)
NEUTS SEG NFR BLD: 72 % (ref 32–75)
NITRITE UR QL STRIP.AUTO: NEGATIVE
NRBC # BLD: 0 K/UL (ref 0–0.01)
NRBC BLD-RTO: 0 PER 100 WBC
PH UR STRIP: 5.5 (ref 5–8)
PLATELET # BLD AUTO: 248 K/UL (ref 150–400)
PMV BLD AUTO: 9.6 FL (ref 8.9–12.9)
POTASSIUM SERPL-SCNC: 4.1 MMOL/L (ref 3.5–5.1)
PROT SERPL-MCNC: 7.3 G/DL (ref 6.4–8.2)
PROT UR STRIP-MCNC: NEGATIVE MG/DL
RBC # BLD AUTO: 4.5 M/UL (ref 3.8–5.2)
RBC #/AREA URNS HPF: ABNORMAL /HPF (ref 0–5)
SARS-COV-2 RDRP RESP QL NAA+PROBE: NOT DETECTED
SODIUM SERPL-SCNC: 141 MMOL/L (ref 136–145)
SOURCE: NORMAL
SP GR UR REFRACTOMETRY: 1.02
SPECIMEN HOLD: NORMAL
URINE CULTURE IF INDICATED: ABNORMAL
UROBILINOGEN UR QL STRIP.AUTO: 1 EU/DL (ref 0.2–1)
WBC # BLD AUTO: 9.8 K/UL (ref 3.6–11)
WBC URNS QL MICRO: ABNORMAL /HPF (ref 0–4)

## 2023-10-05 PROCEDURE — 87635 SARS-COV-2 COVID-19 AMP PRB: CPT

## 2023-10-05 PROCEDURE — 36415 COLL VENOUS BLD VENIPUNCTURE: CPT

## 2023-10-05 PROCEDURE — 87077 CULTURE AEROBIC IDENTIFY: CPT

## 2023-10-05 PROCEDURE — 85025 COMPLETE CBC W/AUTO DIFF WBC: CPT

## 2023-10-05 PROCEDURE — 87040 BLOOD CULTURE FOR BACTERIA: CPT

## 2023-10-05 PROCEDURE — 87186 SC STD MICRODIL/AGAR DIL: CPT

## 2023-10-05 PROCEDURE — 83605 ASSAY OF LACTIC ACID: CPT

## 2023-10-05 PROCEDURE — 81001 URINALYSIS AUTO W/SCOPE: CPT

## 2023-10-05 PROCEDURE — 84145 PROCALCITONIN (PCT): CPT

## 2023-10-05 PROCEDURE — 99285 EMERGENCY DEPT VISIT HI MDM: CPT

## 2023-10-05 PROCEDURE — 80053 COMPREHEN METABOLIC PANEL: CPT

## 2023-10-05 PROCEDURE — 87804 INFLUENZA ASSAY W/OPTIC: CPT

## 2023-10-05 PROCEDURE — 87086 URINE CULTURE/COLONY COUNT: CPT

## 2023-10-05 ASSESSMENT — ENCOUNTER SYMPTOMS
VOMITING: 0
DIARRHEA: 0
NAUSEA: 0
SHORTNESS OF BREATH: 0
ABDOMINAL PAIN: 0

## 2023-10-05 ASSESSMENT — PAIN - FUNCTIONAL ASSESSMENT: PAIN_FUNCTIONAL_ASSESSMENT: NONE - DENIES PAIN

## 2023-10-06 ENCOUNTER — APPOINTMENT (OUTPATIENT)
Facility: HOSPITAL | Age: 83
DRG: 871 | End: 2023-10-06
Payer: MEDICARE

## 2023-10-06 ENCOUNTER — APPOINTMENT (OUTPATIENT)
Facility: HOSPITAL | Age: 83
DRG: 871 | End: 2023-10-06
Attending: INTERNAL MEDICINE
Payer: MEDICARE

## 2023-10-06 PROBLEM — N39.0 UTI (URINARY TRACT INFECTION): Status: ACTIVE | Noted: 2023-10-06

## 2023-10-06 LAB
ECHO AO SINUS VALSALVA DIAM: 3.3 CM
ECHO AO SINUS VALSALVA INDEX: 2.14 CM/M2
ECHO AR MAX VEL PISA: 4 M/S
ECHO AV MEAN GRADIENT: 3 MMHG
ECHO AV MEAN VELOCITY: 0.8 M/S
ECHO AV PEAK GRADIENT: 8 MMHG
ECHO AV PEAK GRADIENT: 8 MMHG
ECHO AV PEAK VELOCITY: 1.4 M/S
ECHO AV PEAK VELOCITY: 1.4 M/S
ECHO AV REGURGITANT PHT: 491.3 MILLISECOND
ECHO AV VTI: 21 CM
ECHO BSA: 1.55 M2
ECHO EST RA PRESSURE: 3 MMHG
ECHO LA DIAMETER INDEX: 2.53 CM/M2
ECHO LA DIAMETER: 3.9 CM
ECHO LA VOL 2C: 101 ML (ref 22–52)
ECHO LA VOL 2C: 106 ML (ref 22–52)
ECHO LA VOL 4C: 73 ML (ref 22–52)
ECHO LA VOL 4C: 78 ML (ref 22–52)
ECHO LA VOLUME AREA LENGTH: 95 ML
ECHO LA VOLUME INDEX AREA LENGTH: 62 ML/M2 (ref 16–34)
ECHO LV FRACTIONAL SHORTENING: 24 % (ref 28–44)
ECHO LV INTERNAL DIMENSION DIASTOLE INDEX: 2.73 CM/M2
ECHO LV INTERNAL DIMENSION DIASTOLIC: 4.2 CM (ref 3.9–5.3)
ECHO LV INTERNAL DIMENSION SYSTOLIC INDEX: 2.08 CM/M2
ECHO LV INTERNAL DIMENSION SYSTOLIC: 3.2 CM
ECHO LV IVSD: 1.3 CM (ref 0.6–0.9)
ECHO LV MASS 2D: 200.6 G (ref 67–162)
ECHO LV MASS INDEX 2D: 130.2 G/M2 (ref 43–95)
ECHO LV POSTERIOR WALL DIASTOLIC: 1.3 CM (ref 0.6–0.9)
ECHO LV RELATIVE WALL THICKNESS RATIO: 0.62
ECHO LVOT AV VTI INDEX: 0.83
ECHO LVOT MEAN GRADIENT: 2 MMHG
ECHO LVOT PEAK GRADIENT: 4 MMHG
ECHO LVOT PEAK GRADIENT: 4 MMHG
ECHO LVOT PEAK VELOCITY: 0.9 M/S
ECHO LVOT PEAK VELOCITY: 1 M/S
ECHO LVOT VTI: 17.5 CM
ECHO MV REGURGITANT ALIASING (NYQUIST) VELOCITY: 31 CM/S
ECHO MV REGURGITANT VELOCITY PISA: 5.3 M/S
ECHO MV REGURGITANT VTIA: 160.2 CM
ECHO PULMONARY ARTERY END DIASTOLIC PRESSURE: 3 MMHG
ECHO PV MAX VELOCITY: 0.8 M/S
ECHO PV PEAK GRADIENT: 2 MMHG
ECHO PV REGURGITANT MAX VELOCITY: 0.9 M/S
ECHO RIGHT VENTRICULAR SYSTOLIC PRESSURE (RVSP): 27 MMHG
ECHO RV INTERNAL DIMENSION: 4.3 CM
ECHO TV REGURGITANT MAX VELOCITY: 2.46 M/S
ECHO TV REGURGITANT PEAK GRADIENT: 25 MMHG
EKG ATRIAL RATE: 91 BPM
EKG DIAGNOSIS: NORMAL
EKG Q-T INTERVAL: 396 MS
EKG QRS DURATION: 116 MS
EKG QTC CALCULATION (BAZETT): 479 MS
EKG R AXIS: 26 DEGREES
EKG T AXIS: 19 DEGREES
EKG VENTRICULAR RATE: 88 BPM
PROCALCITONIN SERPL-MCNC: <0.05 NG/ML

## 2023-10-06 PROCEDURE — 2700000000 HC OXYGEN THERAPY PER DAY

## 2023-10-06 PROCEDURE — 1100000003 HC PRIVATE W/ TELEMETRY

## 2023-10-06 PROCEDURE — 2580000003 HC RX 258: Performed by: INTERNAL MEDICINE

## 2023-10-06 PROCEDURE — 93005 ELECTROCARDIOGRAM TRACING: CPT | Performed by: EMERGENCY MEDICINE

## 2023-10-06 PROCEDURE — 93321 DOPPLER ECHO F-UP/LMTD STD: CPT

## 2023-10-06 PROCEDURE — 2580000003 HC RX 258: Performed by: EMERGENCY MEDICINE

## 2023-10-06 PROCEDURE — 70450 CT HEAD/BRAIN W/O DYE: CPT

## 2023-10-06 PROCEDURE — 6370000000 HC RX 637 (ALT 250 FOR IP): Performed by: INTERNAL MEDICINE

## 2023-10-06 PROCEDURE — 97535 SELF CARE MNGMENT TRAINING: CPT | Performed by: OCCUPATIONAL THERAPIST

## 2023-10-06 PROCEDURE — 97530 THERAPEUTIC ACTIVITIES: CPT

## 2023-10-06 PROCEDURE — 6370000000 HC RX 637 (ALT 250 FOR IP): Performed by: GENERAL ACUTE CARE HOSPITAL

## 2023-10-06 PROCEDURE — 97162 PT EVAL MOD COMPLEX 30 MIN: CPT

## 2023-10-06 PROCEDURE — 6360000002 HC RX W HCPCS: Performed by: EMERGENCY MEDICINE

## 2023-10-06 PROCEDURE — 97530 THERAPEUTIC ACTIVITIES: CPT | Performed by: OCCUPATIONAL THERAPIST

## 2023-10-06 PROCEDURE — 97165 OT EVAL LOW COMPLEX 30 MIN: CPT | Performed by: OCCUPATIONAL THERAPIST

## 2023-10-06 PROCEDURE — 96365 THER/PROPH/DIAG IV INF INIT: CPT

## 2023-10-06 PROCEDURE — 71045 X-RAY EXAM CHEST 1 VIEW: CPT

## 2023-10-06 PROCEDURE — 6360000002 HC RX W HCPCS: Performed by: INTERNAL MEDICINE

## 2023-10-06 RX ORDER — LEVOTHYROXINE SODIUM 0.1 MG/1
100 TABLET ORAL
Status: DISCONTINUED | OUTPATIENT
Start: 2023-10-06 | End: 2023-10-10 | Stop reason: HOSPADM

## 2023-10-06 RX ORDER — ACETAMINOPHEN 325 MG/1
650 TABLET ORAL EVERY 6 HOURS PRN
Status: DISCONTINUED | OUTPATIENT
Start: 2023-10-06 | End: 2023-10-10 | Stop reason: HOSPADM

## 2023-10-06 RX ORDER — ENOXAPARIN SODIUM 100 MG/ML
40 INJECTION SUBCUTANEOUS DAILY
Status: DISCONTINUED | OUTPATIENT
Start: 2023-10-07 | End: 2023-10-10 | Stop reason: HOSPADM

## 2023-10-06 RX ORDER — FUROSEMIDE 10 MG/ML
40 INJECTION INTRAMUSCULAR; INTRAVENOUS DAILY
Status: COMPLETED | OUTPATIENT
Start: 2023-10-06 | End: 2023-10-07

## 2023-10-06 RX ORDER — SODIUM CHLORIDE 0.9 % (FLUSH) 0.9 %
5-40 SYRINGE (ML) INJECTION PRN
Status: DISCONTINUED | OUTPATIENT
Start: 2023-10-06 | End: 2023-10-10 | Stop reason: HOSPADM

## 2023-10-06 RX ORDER — SODIUM CHLORIDE 9 MG/ML
INJECTION, SOLUTION INTRAVENOUS PRN
Status: DISCONTINUED | OUTPATIENT
Start: 2023-10-06 | End: 2023-10-10 | Stop reason: HOSPADM

## 2023-10-06 RX ORDER — MONTELUKAST SODIUM 10 MG/1
10 TABLET ORAL DAILY
Status: DISCONTINUED | OUTPATIENT
Start: 2023-10-06 | End: 2023-10-10 | Stop reason: HOSPADM

## 2023-10-06 RX ORDER — DILTIAZEM HYDROCHLORIDE 120 MG/1
120 CAPSULE, EXTENDED RELEASE ORAL DAILY
Status: DISCONTINUED | OUTPATIENT
Start: 2023-10-06 | End: 2023-10-06

## 2023-10-06 RX ORDER — SODIUM CHLORIDE 0.9 % (FLUSH) 0.9 %
5-40 SYRINGE (ML) INJECTION EVERY 12 HOURS SCHEDULED
Status: DISCONTINUED | OUTPATIENT
Start: 2023-10-06 | End: 2023-10-10 | Stop reason: HOSPADM

## 2023-10-06 RX ORDER — LANOLIN ALCOHOL/MO/W.PET/CERES
6 CREAM (GRAM) TOPICAL NIGHTLY PRN
Status: DISCONTINUED | OUTPATIENT
Start: 2023-10-06 | End: 2023-10-10 | Stop reason: HOSPADM

## 2023-10-06 RX ORDER — ACETAMINOPHEN 650 MG/1
650 SUPPOSITORY RECTAL EVERY 6 HOURS PRN
Status: DISCONTINUED | OUTPATIENT
Start: 2023-10-06 | End: 2023-10-10 | Stop reason: HOSPADM

## 2023-10-06 RX ORDER — ONDANSETRON 4 MG/1
4 TABLET, ORALLY DISINTEGRATING ORAL EVERY 8 HOURS PRN
Status: DISCONTINUED | OUTPATIENT
Start: 2023-10-06 | End: 2023-10-10 | Stop reason: HOSPADM

## 2023-10-06 RX ORDER — HYDRALAZINE HYDROCHLORIDE 20 MG/ML
20 INJECTION INTRAMUSCULAR; INTRAVENOUS EVERY 6 HOURS PRN
Status: DISCONTINUED | OUTPATIENT
Start: 2023-10-06 | End: 2023-10-10 | Stop reason: HOSPADM

## 2023-10-06 RX ORDER — DONEPEZIL HYDROCHLORIDE 5 MG/1
10 TABLET, FILM COATED ORAL DAILY
Status: DISCONTINUED | OUTPATIENT
Start: 2023-10-06 | End: 2023-10-10 | Stop reason: HOSPADM

## 2023-10-06 RX ORDER — ALBUTEROL SULFATE 2.5 MG/3ML
2.5 SOLUTION RESPIRATORY (INHALATION) EVERY 4 HOURS PRN
Status: DISCONTINUED | OUTPATIENT
Start: 2023-10-06 | End: 2023-10-10 | Stop reason: HOSPADM

## 2023-10-06 RX ORDER — ASPIRIN 81 MG/1
81 TABLET ORAL DAILY
Status: DISCONTINUED | OUTPATIENT
Start: 2023-10-06 | End: 2023-10-10 | Stop reason: HOSPADM

## 2023-10-06 RX ORDER — ONDANSETRON 2 MG/ML
4 INJECTION INTRAMUSCULAR; INTRAVENOUS EVERY 4 HOURS PRN
Status: DISCONTINUED | OUTPATIENT
Start: 2023-10-06 | End: 2023-10-06

## 2023-10-06 RX ORDER — SODIUM CHLORIDE 9 MG/ML
INJECTION, SOLUTION INTRAVENOUS CONTINUOUS
Status: DISCONTINUED | OUTPATIENT
Start: 2023-10-06 | End: 2023-10-06

## 2023-10-06 RX ORDER — SERTRALINE HYDROCHLORIDE 25 MG/1
50 TABLET, FILM COATED ORAL DAILY
Status: DISCONTINUED | OUTPATIENT
Start: 2023-10-06 | End: 2023-10-10 | Stop reason: HOSPADM

## 2023-10-06 RX ORDER — DILTIAZEM HYDROCHLORIDE 120 MG/1
120 CAPSULE, COATED, EXTENDED RELEASE ORAL DAILY
Status: DISCONTINUED | OUTPATIENT
Start: 2023-10-06 | End: 2023-10-10 | Stop reason: HOSPADM

## 2023-10-06 RX ORDER — ONDANSETRON 2 MG/ML
4 INJECTION INTRAMUSCULAR; INTRAVENOUS EVERY 6 HOURS PRN
Status: DISCONTINUED | OUTPATIENT
Start: 2023-10-06 | End: 2023-10-10 | Stop reason: HOSPADM

## 2023-10-06 RX ORDER — POLYETHYLENE GLYCOL 3350 17 G/17G
17 POWDER, FOR SOLUTION ORAL DAILY
Status: DISCONTINUED | OUTPATIENT
Start: 2023-10-06 | End: 2023-10-10 | Stop reason: HOSPADM

## 2023-10-06 RX ORDER — BISACODYL 10 MG
10 SUPPOSITORY, RECTAL RECTAL DAILY PRN
Status: DISCONTINUED | OUTPATIENT
Start: 2023-10-06 | End: 2023-10-10 | Stop reason: HOSPADM

## 2023-10-06 RX ORDER — ACETAMINOPHEN 325 MG/1
650 TABLET ORAL EVERY 6 HOURS PRN
Status: DISCONTINUED | OUTPATIENT
Start: 2023-10-06 | End: 2023-10-06

## 2023-10-06 RX ADMIN — METOPROLOL TARTRATE 25 MG: 25 TABLET, FILM COATED ORAL at 21:55

## 2023-10-06 RX ADMIN — MONTELUKAST 10 MG: 10 TABLET, FILM COATED ORAL at 09:53

## 2023-10-06 RX ADMIN — SERTRALINE HYDROCHLORIDE 50 MG: 25 TABLET ORAL at 09:53

## 2023-10-06 RX ADMIN — CARBIDOPA AND LEVODOPA 1 TABLET: 25; 100 TABLET ORAL at 14:30

## 2023-10-06 RX ADMIN — CARBIDOPA AND LEVODOPA 1 TABLET: 25; 100 TABLET ORAL at 21:55

## 2023-10-06 RX ADMIN — LEVOTHYROXINE SODIUM 100 MCG: 0.1 TABLET ORAL at 08:07

## 2023-10-06 RX ADMIN — FUROSEMIDE 40 MG: 10 INJECTION, SOLUTION INTRAMUSCULAR; INTRAVENOUS at 09:53

## 2023-10-06 RX ADMIN — ASPIRIN 81 MG: 81 TABLET, COATED ORAL at 09:53

## 2023-10-06 RX ADMIN — POLYETHYLENE GLYCOL 3350 17 G: 17 POWDER, FOR SOLUTION ORAL at 09:53

## 2023-10-06 RX ADMIN — SODIUM CHLORIDE, PRESERVATIVE FREE 10 ML: 5 INJECTION INTRAVENOUS at 21:57

## 2023-10-06 RX ADMIN — SODIUM CHLORIDE 1000 MG: 900 INJECTION INTRAVENOUS at 00:47

## 2023-10-06 RX ADMIN — DONEPEZIL HYDROCHLORIDE 10 MG: 5 TABLET, FILM COATED ORAL at 09:53

## 2023-10-06 RX ADMIN — SODIUM CHLORIDE, PRESERVATIVE FREE 10 ML: 5 INJECTION INTRAVENOUS at 09:53

## 2023-10-06 RX ADMIN — SODIUM CHLORIDE 1000 MG: 900 INJECTION INTRAVENOUS at 21:52

## 2023-10-06 RX ADMIN — SODIUM CHLORIDE: 9 INJECTION, SOLUTION INTRAVENOUS at 02:05

## 2023-10-06 RX ADMIN — DILTIAZEM HYDROCHLORIDE 120 MG: 120 CAPSULE, COATED, EXTENDED RELEASE ORAL at 09:53

## 2023-10-06 RX ADMIN — METOPROLOL TARTRATE 25 MG: 25 TABLET, FILM COATED ORAL at 09:52

## 2023-10-06 NOTE — ED NOTES
TRANSFER - OUT REPORT:    Verbal report given to IESHA Cabezas on [employer]  being transferred to [facility] for routine progression of patient care       Report consisted of patient's Situation, Background, Assessment and   Recommendations(SBAR). Information from the following report(s) Nurse Handoff Report, Index, ED Encounter Summary, ED SBAR, Adult Overview, Intake/Output, MAR, Recent Results, Med Rec Status, and Cardiac Rhythm AFIB  was reviewed with the receiving nurse. [place] Fall Assessment:    Presents to emergency department  because of falls (Syncope, seizure, or loss of consciousness): No  Age > 70: Yes  Altered Mental Status, Intoxication with alcohol or substance confusion (Disorientation, impaired judgment, poor safety awaremess, or inability to follow instructions): Yes  Impaired Mobility: Ambulates or transfers with assistive devices or assistance; Unable to ambulate or transer.: Yes  Nursing Judgement: Yes          Lines:   Peripheral IV 10/06/23 Left;Proximal Forearm (Active)        Opportunity for questions and clarification was provided.       Patient transported with:  Registered Nurse          Martín Wyman RN  10/06/23 3225

## 2023-10-06 NOTE — ED NOTES
7102: Patient is in bed, pulling at lines and will not keep oxygen pulse ox on. O2 sats falling between 89-91%. Patient placed on 2 L NC.       Padma Alanis  10/06/23 8200

## 2023-10-06 NOTE — H&P
Hospitalist Admission Note    NAME:   Aliza Jack   :    MRN: [id number]     Date/Time: 10/6/2023 2:28 AM    Patient PCP: Kalli Rossi MD    ______________________________________________________________________  Given the patient's current clinical presentation, I have a high level of concern for decompensation if discharged from the emergency department. Complex decision making was performed, which includes reviewing the patient's available past medical records, laboratory results, and x-ray films. My assessment of this patient's clinical condition and my plan of care is as follows.     Assessment / Plan:    Complicated UTI POA  Sepsis POA HR 94, RR 21, normal lactate  Generalized weakness POA  Inability to ambulate POA  Parkinson's disease POA  Baseline dementia POA  Brought in from home with increasing generalized weakness for days  Baseline uses a walker to ambulate, but not consistently    has an aide that comes in during the day   Memory has been declining for year, diagnosed with dementia    Often forgets to use the walker, \"falls frequently\" per the    Now felt not safe at home by family  ED oriented x2, neuro exam non focal, ms seems at baseline  UA 50 -100 WBC, 0-5 RBC, 3+ bacteria, moderate epithelial cells  Portable chest x-ray with no airspace disease   + Cardiomegaly and mild interstitial edema  Head CT negative  Admit blood and urine culture sent  IV ceftriaxone  Hold IV fluids with possible pulmonary interstitial edema  Follow-up on blood and urine cultures  Continue home Parkinson's medications  PT and OT consults    Acute diastolic congestive heart failure POA LVEF 50-55% on last echo 2021  Chronic respiratory failure with hypoxia POA 2 liters O2 at bedtime  Moderate mitral regurgitation and aortic regurgitation POA  Pulmonary hypertension PA pressure 50 in  POA  Baseline on home oxygen 2 liters at night  Nursing staff and ED report sats 89%,

## 2023-10-06 NOTE — ED NOTES
Pts  leaving to go home for the night. His phone number is confirmed with him in the chart. Pt placed on bed alarm at this time.       Carlos Alberto Wing RN  10/06/23 4299

## 2023-10-06 NOTE — ED TRIAGE NOTES
Patient wheeled into triage from waiting room from EMS from home with complaint of increasing weakness. EMS reports patient has had increased weakness over the past few days and family has had concern for increase falling incidents.

## 2023-10-06 NOTE — ED PROVIDER NOTES
EMERGENCY DEPARTMENT HISTORY AND PHYSICAL EXAM     ----------------------------------------------------------------------------  Please note that this dictation was completed with [id number], the computer voice recognition software. Quite often unanticipated grammatical, syntax, homophones, and other interpretive errors are inadvertently transcribed by the computer software. Please disregard these errors. Please excuse any errors that have escaped final proofreading  ----------------------------------------------------------------------------      Date: 10/5/2023  Patient Name: Vicente Scotttom     Chief Complaint   Patient presents with    Fatigue     Increasing over the past few days       History obtainted from:  Patient    Other independent source of history:     HPI: Kira Cordero is a 80 y.o. female, with significant pmhx of IBS, small bowel obstruction, hiatal hernia, hypothyroidism, Parkinson's disease, right bundle branch block, paroxysmal SVT, who presents via private vehicle  to the ED with c/o increased fatigue and generalized weakness throughout today. Patient uses walker at baseline for patient's health and felt as though she is having a harder time getting started today. Patient is very specific complaints of pain. No nausea, vomiting, diarrhea.       PCP: Linda Flores MD    Allergy List:   Allergies   Allergen Reactions    Alendronate Other (See Comments)     Ulcers    Azithromycin Nausea Only     Loss of appetite, jittery    Simvastatin Myalgia     Able to take 10 mg daily only         Medications:  Current Facility-Administered Medications   Medication Dose Route Frequency Provider Last Rate Last Admin    acetaminophen (TYLENOL) tablet 650 mg  650 mg Oral Q6H PRN Lady Radha MD        ondansetron [facility]) injection 4 mg  4 mg IntraVENous Q4H PRN Lady Radha MD        sodium chloride flush 0.9 % injection 5-40 mL  5-40 mL IntraVENous 2

## 2023-10-07 LAB
ALBUMIN SERPL-MCNC: 3.1 G/DL (ref 3.5–5)
ALBUMIN/GLOB SERPL: 0.8 (ref 1.1–2.2)
ALP SERPL-CCNC: 92 U/L (ref 45–117)
ALT SERPL-CCNC: 11 U/L (ref 12–78)
ANION GAP SERPL CALC-SCNC: 7 MMOL/L (ref 5–15)
AST SERPL-CCNC: 17 U/L (ref 15–37)
BASOPHILS # BLD: 0.1 K/UL (ref 0–0.1)
BASOPHILS NFR BLD: 1 % (ref 0–1)
BILIRUB SERPL-MCNC: 0.5 MG/DL (ref 0.2–1)
BUN SERPL-MCNC: 19 MG/DL (ref 6–20)
BUN/CREAT SERPL: 20 (ref 12–20)
CALCIUM SERPL-MCNC: 8.5 MG/DL (ref 8.5–10.1)
CHLORIDE SERPL-SCNC: 106 MMOL/L (ref 97–108)
CO2 SERPL-SCNC: 26 MMOL/L (ref 21–32)
CREAT SERPL-MCNC: 0.94 MG/DL (ref 0.55–1.02)
DIFFERENTIAL METHOD BLD: NORMAL
EOSINOPHIL # BLD: 0.3 K/UL (ref 0–0.4)
EOSINOPHIL NFR BLD: 3 % (ref 0–7)
ERYTHROCYTE [DISTWIDTH] IN BLOOD BY AUTOMATED COUNT: 13 % (ref 11.5–14.5)
GLOBULIN SER CALC-MCNC: 4.1 G/DL (ref 2–4)
GLUCOSE SERPL-MCNC: 91 MG/DL (ref 65–100)
HCT VFR BLD AUTO: 43.9 % (ref 35–47)
HGB BLD-MCNC: 14.7 G/DL (ref 11.5–16)
IMM GRANULOCYTES # BLD AUTO: 0 K/UL (ref 0–0.04)
IMM GRANULOCYTES NFR BLD AUTO: 0 % (ref 0–0.5)
LYMPHOCYTES # BLD: 2.5 K/UL (ref 0.8–3.5)
LYMPHOCYTES NFR BLD: 34 % (ref 12–49)
MCH RBC QN AUTO: 31.8 PG (ref 26–34)
MCHC RBC AUTO-ENTMCNC: 33.5 G/DL (ref 30–36.5)
MCV RBC AUTO: 95 FL (ref 80–99)
MONOCYTES # BLD: 0.5 K/UL (ref 0–1)
MONOCYTES NFR BLD: 7 % (ref 5–13)
NEUTS SEG # BLD: 4.1 K/UL (ref 1.8–8)
NEUTS SEG NFR BLD: 55 % (ref 32–75)
NRBC # BLD: 0 K/UL (ref 0–0.01)
NRBC BLD-RTO: 0 PER 100 WBC
NT PRO BNP: 3051 PG/ML
PLATELET # BLD AUTO: 240 K/UL (ref 150–400)
PMV BLD AUTO: 9.6 FL (ref 8.9–12.9)
POTASSIUM SERPL-SCNC: 3.3 MMOL/L (ref 3.5–5.1)
PROT SERPL-MCNC: 7.2 G/DL (ref 6.4–8.2)
RBC # BLD AUTO: 4.62 M/UL (ref 3.8–5.2)
SODIUM SERPL-SCNC: 139 MMOL/L (ref 136–145)
TSH SERPL DL<=0.05 MIU/L-ACNC: 0.38 UIU/ML (ref 0.36–3.74)
VIT B12 SERPL-MCNC: 421 PG/ML (ref 193–986)
WBC # BLD AUTO: 7.5 K/UL (ref 3.6–11)

## 2023-10-07 PROCEDURE — 2700000000 HC OXYGEN THERAPY PER DAY

## 2023-10-07 PROCEDURE — 80053 COMPREHEN METABOLIC PANEL: CPT

## 2023-10-07 PROCEDURE — 6370000000 HC RX 637 (ALT 250 FOR IP): Performed by: INTERNAL MEDICINE

## 2023-10-07 PROCEDURE — 85025 COMPLETE CBC W/AUTO DIFF WBC: CPT

## 2023-10-07 PROCEDURE — 6370000000 HC RX 637 (ALT 250 FOR IP): Performed by: GENERAL ACUTE CARE HOSPITAL

## 2023-10-07 PROCEDURE — 83880 ASSAY OF NATRIURETIC PEPTIDE: CPT

## 2023-10-07 PROCEDURE — 6360000002 HC RX W HCPCS: Performed by: GENERAL ACUTE CARE HOSPITAL

## 2023-10-07 PROCEDURE — 82607 VITAMIN B-12: CPT

## 2023-10-07 PROCEDURE — 6360000002 HC RX W HCPCS: Performed by: INTERNAL MEDICINE

## 2023-10-07 PROCEDURE — 36415 COLL VENOUS BLD VENIPUNCTURE: CPT

## 2023-10-07 PROCEDURE — 84443 ASSAY THYROID STIM HORMONE: CPT

## 2023-10-07 PROCEDURE — 2580000003 HC RX 258: Performed by: INTERNAL MEDICINE

## 2023-10-07 PROCEDURE — 1100000003 HC PRIVATE W/ TELEMETRY

## 2023-10-07 RX ORDER — FUROSEMIDE 10 MG/ML
40 INJECTION INTRAMUSCULAR; INTRAVENOUS DAILY
Status: DISCONTINUED | OUTPATIENT
Start: 2023-10-07 | End: 2023-10-08

## 2023-10-07 RX ORDER — FLUDROCORTISONE ACETATE 0.1 MG/1
0.1 TABLET ORAL DAILY
Status: DISCONTINUED | OUTPATIENT
Start: 2023-10-07 | End: 2023-10-10 | Stop reason: HOSPADM

## 2023-10-07 RX ORDER — POTASSIUM CHLORIDE 750 MG/1
20 TABLET, FILM COATED, EXTENDED RELEASE ORAL ONCE
Status: COMPLETED | OUTPATIENT
Start: 2023-10-07 | End: 2023-10-07

## 2023-10-07 RX ORDER — POTASSIUM CHLORIDE 750 MG/1
20 TABLET, FILM COATED, EXTENDED RELEASE ORAL DAILY
Status: DISCONTINUED | OUTPATIENT
Start: 2023-10-08 | End: 2023-10-10 | Stop reason: HOSPADM

## 2023-10-07 RX ORDER — FUROSEMIDE 40 MG/1
40 TABLET ORAL DAILY
Status: DISCONTINUED | OUTPATIENT
Start: 2023-10-07 | End: 2023-10-07

## 2023-10-07 RX ORDER — CASTOR OIL AND BALSAM, PERU 788; 87 MG/G; MG/G
OINTMENT TOPICAL 2 TIMES DAILY
Status: DISCONTINUED | OUTPATIENT
Start: 2023-10-07 | End: 2023-10-10 | Stop reason: HOSPADM

## 2023-10-07 RX ORDER — POTASSIUM CHLORIDE 750 MG/1
10 TABLET, FILM COATED, EXTENDED RELEASE ORAL ONCE
Status: COMPLETED | OUTPATIENT
Start: 2023-10-07 | End: 2023-10-07

## 2023-10-07 RX ADMIN — DONEPEZIL HYDROCHLORIDE 10 MG: 5 TABLET, FILM COATED ORAL at 10:46

## 2023-10-07 RX ADMIN — CARBIDOPA AND LEVODOPA 1 TABLET: 25; 100 TABLET ORAL at 15:52

## 2023-10-07 RX ADMIN — Medication: at 15:50

## 2023-10-07 RX ADMIN — ENOXAPARIN SODIUM 40 MG: 100 INJECTION SUBCUTANEOUS at 10:45

## 2023-10-07 RX ADMIN — CARBIDOPA AND LEVODOPA 1 TABLET: 25; 100 TABLET ORAL at 10:46

## 2023-10-07 RX ADMIN — FUROSEMIDE 40 MG: 10 INJECTION, SOLUTION INTRAMUSCULAR; INTRAVENOUS at 10:46

## 2023-10-07 RX ADMIN — POTASSIUM CHLORIDE 10 MEQ: 750 TABLET, FILM COATED, EXTENDED RELEASE ORAL at 13:23

## 2023-10-07 RX ADMIN — CARBIDOPA AND LEVODOPA 1 TABLET: 25; 100 TABLET ORAL at 20:09

## 2023-10-07 RX ADMIN — SERTRALINE HYDROCHLORIDE 50 MG: 25 TABLET ORAL at 10:46

## 2023-10-07 RX ADMIN — MELATONIN 6 MG: at 20:09

## 2023-10-07 RX ADMIN — Medication: at 20:08

## 2023-10-07 RX ADMIN — DILTIAZEM HYDROCHLORIDE 120 MG: 120 CAPSULE, COATED, EXTENDED RELEASE ORAL at 10:46

## 2023-10-07 RX ADMIN — METOPROLOL TARTRATE 25 MG: 25 TABLET, FILM COATED ORAL at 10:46

## 2023-10-07 RX ADMIN — POTASSIUM CHLORIDE 20 MEQ: 750 TABLET, FILM COATED, EXTENDED RELEASE ORAL at 16:43

## 2023-10-07 RX ADMIN — FUROSEMIDE 40 MG: 10 INJECTION, SOLUTION INTRAMUSCULAR; INTRAVENOUS at 13:23

## 2023-10-07 RX ADMIN — SODIUM CHLORIDE, PRESERVATIVE FREE 10 ML: 5 INJECTION INTRAVENOUS at 20:09

## 2023-10-07 RX ADMIN — ASPIRIN 81 MG: 81 TABLET, COATED ORAL at 10:46

## 2023-10-07 RX ADMIN — FLUDROCORTISONE ACETATE 0.1 MG: 0.1 TABLET ORAL at 15:50

## 2023-10-07 RX ADMIN — SODIUM CHLORIDE, PRESERVATIVE FREE 10 ML: 5 INJECTION INTRAVENOUS at 10:47

## 2023-10-07 RX ADMIN — LEVOTHYROXINE SODIUM 100 MCG: 0.1 TABLET ORAL at 11:08

## 2023-10-07 RX ADMIN — METOPROLOL TARTRATE 25 MG: 25 TABLET, FILM COATED ORAL at 20:09

## 2023-10-07 RX ADMIN — MONTELUKAST 10 MG: 10 TABLET, FILM COATED ORAL at 10:46

## 2023-10-07 RX ADMIN — SODIUM CHLORIDE 1000 MG: 900 INJECTION INTRAVENOUS at 22:12

## 2023-10-08 LAB
ANION GAP SERPL CALC-SCNC: 6 MMOL/L (ref 5–15)
BACTERIA SPEC CULT: ABNORMAL
BUN SERPL-MCNC: 25 MG/DL (ref 6–20)
BUN/CREAT SERPL: 25 (ref 12–20)
CALCIUM SERPL-MCNC: 8.8 MG/DL (ref 8.5–10.1)
CC UR VC: ABNORMAL
CHLORIDE SERPL-SCNC: 104 MMOL/L (ref 97–108)
CO2 SERPL-SCNC: 29 MMOL/L (ref 21–32)
CREAT SERPL-MCNC: 1.01 MG/DL (ref 0.55–1.02)
GLUCOSE SERPL-MCNC: 106 MG/DL (ref 65–100)
MAGNESIUM SERPL-MCNC: 2.2 MG/DL (ref 1.6–2.4)
POTASSIUM SERPL-SCNC: 3.5 MMOL/L (ref 3.5–5.1)
SERVICE CMNT-IMP: ABNORMAL
SODIUM SERPL-SCNC: 139 MMOL/L (ref 136–145)

## 2023-10-08 PROCEDURE — 6360000002 HC RX W HCPCS: Performed by: GENERAL ACUTE CARE HOSPITAL

## 2023-10-08 PROCEDURE — 83735 ASSAY OF MAGNESIUM: CPT

## 2023-10-08 PROCEDURE — 80048 BASIC METABOLIC PNL TOTAL CA: CPT

## 2023-10-08 PROCEDURE — 6360000002 HC RX W HCPCS: Performed by: INTERNAL MEDICINE

## 2023-10-08 PROCEDURE — 6370000000 HC RX 637 (ALT 250 FOR IP): Performed by: GENERAL ACUTE CARE HOSPITAL

## 2023-10-08 PROCEDURE — 36415 COLL VENOUS BLD VENIPUNCTURE: CPT

## 2023-10-08 PROCEDURE — 2700000000 HC OXYGEN THERAPY PER DAY

## 2023-10-08 PROCEDURE — 1100000003 HC PRIVATE W/ TELEMETRY

## 2023-10-08 PROCEDURE — 2580000003 HC RX 258: Performed by: INTERNAL MEDICINE

## 2023-10-08 PROCEDURE — 6370000000 HC RX 637 (ALT 250 FOR IP): Performed by: INTERNAL MEDICINE

## 2023-10-08 PROCEDURE — 2580000003 HC RX 258: Performed by: GENERAL ACUTE CARE HOSPITAL

## 2023-10-08 RX ORDER — FUROSEMIDE 40 MG/1
40 TABLET ORAL DAILY
Status: DISCONTINUED | OUTPATIENT
Start: 2023-10-09 | End: 2023-10-10 | Stop reason: HOSPADM

## 2023-10-08 RX ADMIN — SODIUM CHLORIDE 1000 MG: 900 INJECTION INTRAVENOUS at 21:10

## 2023-10-08 RX ADMIN — CARBIDOPA AND LEVODOPA 1 TABLET: 25; 100 TABLET ORAL at 21:09

## 2023-10-08 RX ADMIN — CARBIDOPA AND LEVODOPA 1 TABLET: 25; 100 TABLET ORAL at 15:21

## 2023-10-08 RX ADMIN — DONEPEZIL HYDROCHLORIDE 10 MG: 5 TABLET, FILM COATED ORAL at 09:59

## 2023-10-08 RX ADMIN — POLYETHYLENE GLYCOL 3350 17 G: 17 POWDER, FOR SOLUTION ORAL at 09:59

## 2023-10-08 RX ADMIN — DILTIAZEM HYDROCHLORIDE 120 MG: 120 CAPSULE, COATED, EXTENDED RELEASE ORAL at 10:00

## 2023-10-08 RX ADMIN — METOPROLOL TARTRATE 25 MG: 25 TABLET, FILM COATED ORAL at 10:00

## 2023-10-08 RX ADMIN — ENOXAPARIN SODIUM 40 MG: 100 INJECTION SUBCUTANEOUS at 10:00

## 2023-10-08 RX ADMIN — ASPIRIN 81 MG: 81 TABLET, COATED ORAL at 09:59

## 2023-10-08 RX ADMIN — SERTRALINE HYDROCHLORIDE 50 MG: 25 TABLET ORAL at 10:00

## 2023-10-08 RX ADMIN — SODIUM CHLORIDE, PRESERVATIVE FREE 10 ML: 5 INJECTION INTRAVENOUS at 10:04

## 2023-10-08 RX ADMIN — Medication: at 21:11

## 2023-10-08 RX ADMIN — FLUDROCORTISONE ACETATE 0.1 MG: 0.1 TABLET ORAL at 09:59

## 2023-10-08 RX ADMIN — CARBIDOPA AND LEVODOPA 1 TABLET: 25; 100 TABLET ORAL at 10:00

## 2023-10-08 RX ADMIN — SODIUM CHLORIDE, PRESERVATIVE FREE 10 ML: 5 INJECTION INTRAVENOUS at 21:11

## 2023-10-08 RX ADMIN — POTASSIUM CHLORIDE 20 MEQ: 750 TABLET, FILM COATED, EXTENDED RELEASE ORAL at 09:59

## 2023-10-08 RX ADMIN — LEVOTHYROXINE SODIUM 100 MCG: 0.1 TABLET ORAL at 09:59

## 2023-10-08 RX ADMIN — Medication: at 10:01

## 2023-10-08 RX ADMIN — METOPROLOL TARTRATE 25 MG: 25 TABLET, FILM COATED ORAL at 21:10

## 2023-10-08 RX ADMIN — FUROSEMIDE 40 MG: 10 INJECTION, SOLUTION INTRAMUSCULAR; INTRAVENOUS at 10:00

## 2023-10-08 RX ADMIN — MONTELUKAST 10 MG: 10 TABLET, FILM COATED ORAL at 10:00

## 2023-10-08 NOTE — CARE COORDINATION
Care Management Initial Assessment       RUR:  Not calculated   Readmission? No  1st IM letter given? Yes  1st  letter given: No             10/08/23 1101   Service Assessment   Patient Orientation Other (see comment)  (Pt was alert but confused.)   Cognition Dementia / Early Alzheimer's   History Provided By Significant Other;Child/Family   Primary Caregiver Private caregiver  (Pt has a caregiver with Niurka Perry)   Accompanied By/Relationship [address]; [address]; Other (Comment)  (Caregiver)   PCP Verified by CM Yes  (Pt's PCP is Zoey Gottlieb)   Prior Functional Level Assistance with the following:;Bathing;Dressing;Cooking; Toileting;Housework   Current Functional Level Assistance with the following:;Bathing;Dressing; Toileting;Cooking;Housework   Can patient return to prior living arrangement Unknown at present   Would you like for me to discuss the discharge plan with any other family members/significant others, and if so, who? Yes  (Pt's son Enid Mullen - [phone])   Financial Resources Medicare   Community Resources None   Social/Functional History   Lives With Spouse   Type of [address] [place] [address] Dr One level  (Pt resides in an one level home with 3 NINA.)   Home Access Stairs to enter with [facility], [facility];Cane   Active  No   Discharge Planning   Type of Residence Other (Comment)  (SNF vs Home with [facility])   Current Services Prior To Admission None   Patient expects to be discharged to: Unknown  (SNF vs Home with home health)       CM met with pt, pt's  and pt's son Zenaida Morley at bedside to discuss d/c plan. Pt was confused. CM verified pt's demographics, insurance and PCP    Pt is a 79 y/o  female admitted to [facility] ER on 10/16/2023 for acute pulmonary edema. Pt's PCP is Dr. Zoey Gottlieb. Pt uses [address] for Rx. Pt resides in an one level home with 3 NINA with .  Pt

## 2023-10-09 PROCEDURE — 6370000000 HC RX 637 (ALT 250 FOR IP): Performed by: GENERAL ACUTE CARE HOSPITAL

## 2023-10-09 PROCEDURE — 2700000000 HC OXYGEN THERAPY PER DAY

## 2023-10-09 PROCEDURE — 6370000000 HC RX 637 (ALT 250 FOR IP): Performed by: INTERNAL MEDICINE

## 2023-10-09 PROCEDURE — 97535 SELF CARE MNGMENT TRAINING: CPT | Performed by: OCCUPATIONAL THERAPIST

## 2023-10-09 PROCEDURE — 6360000002 HC RX W HCPCS: Performed by: INTERNAL MEDICINE

## 2023-10-09 PROCEDURE — 1100000003 HC PRIVATE W/ TELEMETRY

## 2023-10-09 PROCEDURE — 2580000003 HC RX 258: Performed by: INTERNAL MEDICINE

## 2023-10-09 PROCEDURE — 97530 THERAPEUTIC ACTIVITIES: CPT | Performed by: OCCUPATIONAL THERAPIST

## 2023-10-09 RX ADMIN — FUROSEMIDE 40 MG: 40 TABLET ORAL at 09:02

## 2023-10-09 RX ADMIN — METOPROLOL TARTRATE 25 MG: 25 TABLET, FILM COATED ORAL at 20:54

## 2023-10-09 RX ADMIN — FLUDROCORTISONE ACETATE 0.1 MG: 0.1 TABLET ORAL at 09:02

## 2023-10-09 RX ADMIN — SODIUM CHLORIDE, PRESERVATIVE FREE 10 ML: 5 INJECTION INTRAVENOUS at 09:03

## 2023-10-09 RX ADMIN — MONTELUKAST 10 MG: 10 TABLET, FILM COATED ORAL at 09:02

## 2023-10-09 RX ADMIN — LEVOTHYROXINE SODIUM 100 MCG: 0.1 TABLET ORAL at 06:27

## 2023-10-09 RX ADMIN — CARBIDOPA AND LEVODOPA 1 TABLET: 25; 100 TABLET ORAL at 20:54

## 2023-10-09 RX ADMIN — METOPROLOL TARTRATE 25 MG: 25 TABLET, FILM COATED ORAL at 09:01

## 2023-10-09 RX ADMIN — POLYETHYLENE GLYCOL 3350 17 G: 17 POWDER, FOR SOLUTION ORAL at 09:02

## 2023-10-09 RX ADMIN — SODIUM CHLORIDE, PRESERVATIVE FREE 10 ML: 5 INJECTION INTRAVENOUS at 20:56

## 2023-10-09 RX ADMIN — MELATONIN 6 MG: at 20:55

## 2023-10-09 RX ADMIN — Medication: at 09:02

## 2023-10-09 RX ADMIN — ENOXAPARIN SODIUM 40 MG: 100 INJECTION SUBCUTANEOUS at 09:01

## 2023-10-09 RX ADMIN — Medication: at 20:55

## 2023-10-09 RX ADMIN — CARBIDOPA AND LEVODOPA 1 TABLET: 25; 100 TABLET ORAL at 09:02

## 2023-10-09 RX ADMIN — ASPIRIN 81 MG: 81 TABLET, COATED ORAL at 09:02

## 2023-10-09 RX ADMIN — SERTRALINE HYDROCHLORIDE 50 MG: 25 TABLET ORAL at 09:02

## 2023-10-09 RX ADMIN — DONEPEZIL HYDROCHLORIDE 10 MG: 5 TABLET, FILM COATED ORAL at 09:02

## 2023-10-09 RX ADMIN — POTASSIUM CHLORIDE 20 MEQ: 750 TABLET, FILM COATED, EXTENDED RELEASE ORAL at 09:02

## 2023-10-09 RX ADMIN — CARBIDOPA AND LEVODOPA 1 TABLET: 25; 100 TABLET ORAL at 13:58

## 2023-10-09 RX ADMIN — DILTIAZEM HYDROCHLORIDE 120 MG: 120 CAPSULE, COATED, EXTENDED RELEASE ORAL at 09:02

## 2023-10-09 NOTE — CARE COORDINATION
Transition of Care Plan:    RUR: 14%   Prior Level of Functioning: assistance with ADLs  Disposition: home with spouse & 24/7 caregivers through [facility]  If SNF or IPR: Date FOC offered:   Date FOC received:   Accepting facility:   Date authorization started with reference number:   Date authorization received and expires: Follow up appointments: PCP, Specialists  DME needed: Pt has a RW and cane. Transportation at discharge: BLS  IM/IMM Medicare/ letter given: needed at d/c  Is patient a [place] and connected with [place]? If yes, was [place] transfer form completed and [facility] notified? Caregiver Contact: Aneta Moncada  [phone]  Discharge Caregiver contacted prior to discharge? Care Conference needed? Barriers to discharge:     CM met with pt, pt's spouse, son, and two cousins at bedside who are desiring for pt to go home with 24/7 caregivers through [facility].   They are requesting BLS transport at d/c.      Alfred January, MELVIN  Supervisee in [facility]

## 2023-10-10 VITALS
DIASTOLIC BLOOD PRESSURE: 78 MMHG | WEIGHT: 124 LBS | RESPIRATION RATE: 26 BRPM | HEIGHT: 61 IN | BODY MASS INDEX: 23.41 KG/M2 | TEMPERATURE: 98 F | SYSTOLIC BLOOD PRESSURE: 159 MMHG | OXYGEN SATURATION: 94 % | HEART RATE: 70 BPM

## 2023-10-10 PROCEDURE — 2580000003 HC RX 258: Performed by: INTERNAL MEDICINE

## 2023-10-10 PROCEDURE — 6370000000 HC RX 637 (ALT 250 FOR IP): Performed by: INTERNAL MEDICINE

## 2023-10-10 PROCEDURE — 2700000000 HC OXYGEN THERAPY PER DAY

## 2023-10-10 PROCEDURE — 6360000002 HC RX W HCPCS: Performed by: INTERNAL MEDICINE

## 2023-10-10 PROCEDURE — 6370000000 HC RX 637 (ALT 250 FOR IP): Performed by: GENERAL ACUTE CARE HOSPITAL

## 2023-10-10 RX ADMIN — METOPROLOL TARTRATE 25 MG: 25 TABLET, FILM COATED ORAL at 08:50

## 2023-10-10 RX ADMIN — SERTRALINE HYDROCHLORIDE 50 MG: 25 TABLET ORAL at 08:50

## 2023-10-10 RX ADMIN — FLUDROCORTISONE ACETATE 0.1 MG: 0.1 TABLET ORAL at 08:50

## 2023-10-10 RX ADMIN — Medication: at 08:51

## 2023-10-10 RX ADMIN — MONTELUKAST 10 MG: 10 TABLET, FILM COATED ORAL at 08:50

## 2023-10-10 RX ADMIN — ASPIRIN 81 MG: 81 TABLET, COATED ORAL at 08:50

## 2023-10-10 RX ADMIN — FUROSEMIDE 40 MG: 40 TABLET ORAL at 08:50

## 2023-10-10 RX ADMIN — SODIUM CHLORIDE, PRESERVATIVE FREE 10 ML: 5 INJECTION INTRAVENOUS at 08:50

## 2023-10-10 RX ADMIN — ENOXAPARIN SODIUM 40 MG: 100 INJECTION SUBCUTANEOUS at 08:49

## 2023-10-10 RX ADMIN — LEVOTHYROXINE SODIUM 100 MCG: 0.1 TABLET ORAL at 08:50

## 2023-10-10 RX ADMIN — CARBIDOPA AND LEVODOPA 1 TABLET: 25; 100 TABLET ORAL at 08:50

## 2023-10-10 RX ADMIN — POTASSIUM CHLORIDE 20 MEQ: 750 TABLET, FILM COATED, EXTENDED RELEASE ORAL at 08:50

## 2023-10-10 RX ADMIN — DONEPEZIL HYDROCHLORIDE 10 MG: 5 TABLET, FILM COATED ORAL at 08:50

## 2023-10-10 RX ADMIN — POLYETHYLENE GLYCOL 3350 17 G: 17 POWDER, FOR SOLUTION ORAL at 08:50

## 2023-10-10 RX ADMIN — DILTIAZEM HYDROCHLORIDE 120 MG: 120 CAPSULE, COATED, EXTENDED RELEASE ORAL at 08:50

## 2023-10-10 NOTE — PLAN OF CARE
Problem: Discharge Planning  Goal: Discharge to home or other facility with appropriate resources  10/10/2023 1233 by Jaylene Rosa RN  Outcome: Adequate for Discharge  10/10/2023 1036 by Jaylene Rosa RN  Outcome: Progressing     Problem: Safety - Adult  Goal: Free from fall injury  10/10/2023 1233 by Jaylene Rosa RN  Outcome: Adequate for Discharge  10/10/2023 1036 by Jaylene Rosa RN  Outcome: Progressing     Problem: Pain  Goal: Verbalizes/displays adequate comfort level or baseline comfort level  10/10/2023 1233 by Jaylene Rosa RN  Outcome: Adequate for Discharge  10/10/2023 1036 by Jaylene Rosa RN  Outcome: Progressing
Problem: Discharge Planning  Goal: Discharge to home or other facility with appropriate resources  10/6/2023 1554 by Zee Childers RN  Outcome: Progressing  10/6/2023 1553 by Zee Childers RN  Outcome: Progressing  Flowsheets  Taken 10/6/2023 1000  Discharge to home or other facility with appropriate resources:   Identify barriers to discharge with patient and caregiver   Identify discharge learning needs (meds, wound care, etc)   Refer to discharge planning if patient needs post-hospital services based on physician order or complex needs related to functional status, cognitive ability or social support system  Taken 10/6/2023 0730  Discharge to home or other facility with appropriate resources:   Identify barriers to discharge with patient and caregiver   Arrange for needed discharge resources and transportation as appropriate   Identify discharge learning needs (meds, wound care, etc)  10/6/2023 0359 by Alba Segundo RN  Outcome: Progressing     Problem: Safety - Adult  Goal: Free from fall injury  10/6/2023 1554 by Zee Childers RN  Outcome: Progressing  10/6/2023 1553 by Zee Childers RN  Outcome: Progressing  Flowsheets (Taken 10/6/2023 0730)  Free From Fall Injury: Instruct family/caregiver on patient safety  10/6/2023 0359 by Alba Segundo RN  Outcome: Progressing     Problem: Occupational Therapy - Adult  Goal: By Discharge: Performs self-care activities at highest level of function for planned discharge setting. See evaluation for individualized goals. Description: FUNCTIONAL STATUS PRIOR TO ADMISSION:  Patient ambulates with a RW and CGA, she needs assistance for bed mobility and transfers, but it is unclear of how much assistance she needs. Her  states that she receives assistance for ADLs, but he is unable to clarify how much assistance the caregivers provide.    HOME SUPPORT: Patient was living her  and has caregivers coming in to the home from 8:00 a.m. to 4:00
Problem: Discharge Planning  Goal: Discharge to home or other facility with appropriate resources  10/7/2023 0242 by Tish Gallagher RN  Outcome: Progressing  Flowsheets (Taken 10/6/2023 2323)  Discharge to home or other facility with appropriate resources: Identify discharge learning needs (meds, wound care, etc)  10/6/2023 1554 by Winsome Smith RN  Outcome: Progressing  10/6/2023 1553 by Winsome Smith RN  Outcome: Progressing  Flowsheets  Taken 10/6/2023 1000  Discharge to home or other facility with appropriate resources:   Identify barriers to discharge with patient and caregiver   Identify discharge learning needs (meds, wound care, etc)   Refer to discharge planning if patient needs post-hospital services based on physician order or complex needs related to functional status, cognitive ability or social support system  Taken 10/6/2023 0730  Discharge to home or other facility with appropriate resources:   Identify barriers to discharge with patient and caregiver   Arrange for needed discharge resources and transportation as appropriate   Identify discharge learning needs (meds, wound care, etc)     Problem: Safety - Adult  Goal: Free from fall injury  10/7/2023 0242 by Tish Gallagher RN  Outcome: Progressing  10/6/2023 1554 by Winsome Smith RN  Outcome: Progressing  10/6/2023 1553 by Winsome Smith RN  Outcome: Progressing  Flowsheets (Taken 10/6/2023 0730)  Free From Fall Injury: Instruct family/caregiver on patient safety     Problem: Occupational Therapy - Adult  Goal: By Discharge: Performs self-care activities at highest level of function for planned discharge setting. See evaluation for individualized goals. Description: FUNCTIONAL STATUS PRIOR TO ADMISSION:  Patient ambulates with a RW and CGA, she needs assistance for bed mobility and transfers, but it is unclear of how much assistance she needs.  Her  states that she receives assistance for ADLs, but he is unable to clarify how much
Problem: Discharge Planning  Goal: Discharge to home or other facility with appropriate resources  Outcome: Progressing     Problem: Safety - Adult  Goal: Free from fall injury  Outcome: Progressing
Problem: Discharge Planning  Goal: Discharge to home or other facility with appropriate resources  Outcome: Progressing     Problem: Safety - Adult  Goal: Free from fall injury  Outcome: Progressing
Problem: Discharge Planning  Goal: Discharge to home or other facility with appropriate resources  Outcome: Progressing     Problem: Safety - Adult  Goal: Free from fall injury  Outcome: Progressing     Problem: Pain  Goal: Verbalizes/displays adequate comfort level or baseline comfort level  Outcome: Progressing
Problem: Discharge Planning  Goal: Discharge to home or other facility with appropriate resources  Outcome: Progressing     Problem: Safety - Adult  Goal: Free from fall injury  Outcome: Progressing     Problem: Pain  Goal: Verbalizes/displays adequate comfort level or baseline comfort level  Outcome: Progressing
Problem: Physical Therapy - Adult  Goal: By Discharge: Performs mobility at highest level of function for planned discharge setting. See evaluation for individualized goals. Description: FUNCTIONAL STATUS PRIOR TO ADMISSION: Patient was using a rolling walker for functional mobility and constant assistance with all functional mobility and transfers. and The patient required moderate assistance for basic and instrumental ADLs. Spouse reports at least 20 falls this year. HOME SUPPORT PRIOR TO ADMISSION: The patient lived with spouse who provides assistance for all mobility, has daily Khari Flock that is at home 8-4pm and provides assist with all ADLs. Physical Therapy Goals  Initiated 10/6/2023  1. Patient will move from supine to sit and sit to supine, scoot up and down, and roll side to side in bed with contact guard assist within 7 day(s). 2.  Patient will perform sit to stand with minimal assistance within 7 day(s). 3.  Patient will transfer from bed to chair and chair to bed with minimal assistance using the least restrictive device within 7 day(s). 4.  Patient will ambulate with minimal assistance for 25 feet with the least restrictive device within 7 day(s). Outcome: Progressing   PHYSICAL THERAPY EVALUATION    Patient: Annabel Mohs (68 y.o. female)  Date: 10/6/2023  Primary Diagnosis: Acute pulmonary edema (HCC) [J81.0]  UTI (urinary tract infection) [N39.0]  Encephalopathy acute [G93.40]  Acute cystitis without hematuria [N30.00]  Parkinson's disease with dyskinesia and fluctuating manifestations [G20. B2]       Precautions: Fall Risk                  ASSESSMENT :   DEFICITS/IMPAIRMENTS:   The patient is limited by decreased functional mobility, independence in ADLs, strength, activity tolerance, cognition, command following, attention/concentration, balance, increased fall risk, symptomatic hypotension, and decreased overall independence following admission for UTI.  PmHx
Problem: Safety - Adult  Goal: Free from fall injury  Outcome: Progressing
Bathroom Equipment: Grab bars around toilet, Shower chair (Simultaneous filing. User may not have seen previous data.)     Home Equipment: Walker, rolling (Simultaneous filing. User may not have seen previous data.)  Has the patient had two or more falls in the past year or any fall with injury in the past year?: Yes (spouse reports at least 21 falls this past year)        Hand Dominance: right     EXAMINATION OF PERFORMANCE DEFICITS:    Cognitive/Behavioral Status:  Orientation  Overall Orientation Status: Impaired  Orientation Level: Oriented to place;Oriented to person;Disoriented to situation;Disoriented to time  Cognition  Overall Cognitive Status: Exceptions  Arousal/Alertness: Delayed responses to stimuli  Following Commands: Follows one step commands with increased time; Follows one step commands with repetition  Attention Span: Attends with cues to redirect  Memory: Decreased recall of recent events;Decreased short term memory  Safety Judgement: Decreased awareness of need for safety  Problem Solving: Assistance required to generate solutions;Assistance required to implement solutions  Insights: Decreased awareness of deficits  Initiation: Requires cues for some  Sequencing: Requires cues for some    Hearing:    WFL  Vision/Perceptual:        WFL, wears glasses for reading      Range of Motion:   AROM: Generally decreased, functional    Strength:  Strength: Generally decreased, functional      Coordination:  Coordination: Generally decreased, functional     Coordination: Generally decreased, functional      Tone & Sensation:   Tone: Normal  Sensation: Intact    Functional Mobility and Transfers for ADLs:    Bed Mobility:     Bed Mobility Training  Bed Mobility Training: Yes  Supine to Sit: Minimum assistance; Moderate assistance;Assist X2  Scooting: Moderate assistance    Transfers:     Transfer Training  Transfer Training: Yes  Sit to Stand:  Moderate assistance;Assist X2  Stand to Sit: Moderate

## 2023-10-10 NOTE — DISCHARGE INSTRUCTIONS
HOSPITALIST DISCHARGE INSTRUCTIONS    It is important that you take the medication exactly as they are prescribed. Keep your medication in the bottles provided by the pharmacist and keep a list of the medication names, dosages, and times to be taken in your wallet. Do not take other medications without consulting your doctor. What to do at Home    Recommended diet:  regular diet    Recommended activity: activity as tolerated      If you have questions regarding the hospital related prescriptions or hospital related issues please call [address] [place] [address] office at [phone]. You can always direct your questions to your primary care doctor if you are unable to reach your hospital physician; your PCP works as an extension of your hospital doctor just like your hospital doctor is an extension of your PCP for your time at the hospital [facility], [facility])    If you experience any of the following symptoms then please call your primary care physician or return to the emergency room if you cannot get hold of your doctor:    Fever, chills, nausea, vomiting, or persistent diarrhea  Worsening weakness or new problems with your speech or balance  Dark stools or visible blood in your stools  New Leg swelling or shortness of breath as these could be signs of a clot    Additional Instructions:    Please follow up with your PCP in one week. Bring these papers with you to your follow up appointments. The papers will help your doctors be sure to continue the care plan from the hospital.              Information obtained by :  I understand that if any problems occur once I am at home I am to contact my physician. I understand and acknowledge receipt of the instructions indicated above.                                                                                                                                            Physician's or R.N.'s Signature

## 2023-10-10 NOTE — CARE COORDINATION
DISCHARGE ORDERS WRITTEN       10/10/23 0496   Discharge Planning   Type of Residence House  (Discharging back to home address, [facility] / 7)   Living Arrangements Spouse/Significant Other;Family Members   Patient expects to be discharged to: [facility]   Transition of Care Consult (CM Consult) Other  ([facility])   Services At/After Discharge In ambulance;Community Resources  ([facility] in the home 24/7, set up by family)   Mode of Transport at Discharge [facility]  Alize Rob    [phone])   Hospital Transport Time of Discharge [phone]  (Spoke with family/Rosario)       CM spoke with Rosario/cousin, she would like patient transported via stretcher to home address as soon as possible. CM placed call to [facility] first available 1700  [facility] first available [address] has 1215 availability, discussed with RN. Family agreeable.       SMAARTER TOOL COMPLETED  PCS Placed on Bedside  [facility] [phone]    [facility]  Care Management  [phone]/[id number] on [employer]

## 2023-10-10 NOTE — PROGRESS NOTES
brought in meds but Nadya Luke was not one of them. Per Amy will convert to IR. Patient takes 48.75/195 mg Rytary BID which is 390 mg levodopa daily. Conversion is inexact but is roughly 1.5-2 mg levodopa IR:1 mg levodopa ER.  Will start patient on carbidopa levodopa  mg tid and assess patient moving forward
12:21- talked with Mery Handler through phone to update on AVS and pt on the way after transportation picked pt up
2320 Bedside and Verbal shift change report given to Makayla Page RN (oncoming nurse) by Petr Coello RN (offgoing nurse). Report included the following information Nurse Handoff Report, MAR, Recent Results, and Cardiac Rhythm A.Fib . Patient is confused and IV cannula was removed at 2300.    0330 Handed over back to [facility] for continuity of care                        .
BMP/MG redraw sent
Bedside and Verbal shift change report given to Camron Mcguire RN  (oncoming nurse) by Bobby Delong RN (offgoing nurse). Report included the following information Nurse Handoff Report, Index, ED Encounter Summary, Intake/Output, MAR, Recent Results, and Cardiac Rhythm Afib . Oral medications administered crushed in applesauce. End of Shift Note    Bedside shift change report given to RN (oncoming nurse) by Camron Mcguire RN (offgoing nurse). Report included the following information SBAR, Kardex, ED Summary, Intake/Output, MAR, Recent Results, and Cardiac Rhythm Afib    Shift worked:  2638-1438     Shift summary and any significant changes:     No significant changes    1:1 safety sitter at bedside this shift. Pt had periodic episodes of impulsive behavior but was easily redirected by sitter. Pt tolerated medications well crushed    Pt family provided contact information for case management- family hoping to discuss options for long term care at discharge     Concerns for physician to address:       Zone phone for oncoming shift:          Activity:     Number times ambulated in hallways past shift: 0  Number of times OOB to chair past shift: 0    Cardiac:   Cardiac Monitoring: Yes           Access:  Current line(s): PIV     Genitourinary:   Urinary status: voiding    Respiratory:      Chronic home O2 use?: N/A  Incentive spirometer at bedside: NO       GI:     Current diet:  ADULT DIET; Regular  Passing flatus: YES  Tolerating current diet: YES       Pain Management:   Patient states pain is manageable on current regimen: YES    Skin:     Interventions: specialty bed, float heels, increase time out of bed, PT/OT consult, limit briefs, and internal/external urinary devices    Patient Safety:  Fall Score:    Interventions: bed/chair alarm, assistive device (walker, cane.  etc), gripper socks, pt to call before getting OOB, and stay with me (per policy)       Length of Stay:  Expected LOS: 4  Actual LOS:
Bedside and Verbal shift change report given to Jocelynn JULIAN (oncoming nurse) by Roselyn Jj (offgoing nurse). Report included the following information Nurse Handoff Report, MAR, and Cardiac Rhythm atrial fib .     1950: AVASYS acquired due to patient impulsiveness for fall prevention    2320: Report given by this RN on CMSU to [place] on CMSU    0400: Report received to this RN on CMSU from [place] on TXU [place]     End of Shift Note    Bedside shift change report given to [employer] (oncoming nurse) by Siva Shields RN (offgoing nurse). Report included the following information SBAR, MAR, and Cardiac Rhythm atrial fib    Shift worked: 7p-7a     Shift summary and any significant changes:    See above     Concerns for physician to address: Patient may benefit from PT/ OT consult   Zone phone for oncoming shift:       Activity:     Number times ambulated in hallways past shift: 0  Number of times OOB to chair past shift: 0    Cardiac:   Cardiac Monitoring: Yes           Access:  Current line(s): PIV     Genitourinary:   Urinary status: voiding and incontinent    Respiratory:      Chronic home O2 use?: NO  Incentive spirometer at bedside: NO       GI:     Current diet:  ADULT DIET; Regular  Passing flatus: YES  Tolerating current diet: YES       Pain Management:   Patient states pain is manageable on current regimen: YES    Skin:     Interventions: specialty bed, float heels, increase time out of bed, PT/OT consult, limit briefs, internal/external urinary devices, and nutritional support    Patient Safety:  Fall Score:    Interventions: bed/chair alarm, assistive device (walker, cane.  etc), gripper socks, pt to call before getting OOB, stay with me (per policy), tele-sitter, and gait belt       Length of Stay:  Expected LOS: 4  Actual LOS: [address], RN
End of Shift Note    Bedside shift change report given to Michael López (oncoming nurse) by Faby Gaines RN (offgoing nurse). Report included the following information Nurse Handoff Report, MAR, Recent Results, Med Rec Status, and Cardiac Rhythm        Shift worked:  7a-7p   Shift summary and any significant changes:    Pt turned Q2H and heels elevated. Concerns for physician to address:  none   Zone phone for oncoming shift:  [phone]     Patient Information  Jazlyn Hines  80 y.o.  10/5/2023  9:33 PM by Radha Varela MD. 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    Rapid palpitations 12/22/2014    Paroxysmal SVT (supraventricular tachycardia) 12/22/2014    Depression 12/11/2014    Fatigue 12/11/2014    Valvular heart disease 09/11/2014    DJD (degenerative joint disease) 09/11/2014    Palpitation 09/10/2014    Chronic insomnia 08/26/2014    COPD (chronic obstructive pulmonary disease) ([address]) 08/26/2014    Breast calcification seen on mammogram     MANSFIELD (dyspnea on exertion) 02/03/2014    Dyslipidemia 02/03/2014    Tobacco use disorder 02/03/2014    Vitamin D deficiency 11/18/2013    Hypothyroid 11/18/2013    Psoriasis 08/01/2011    GERD (gastroesophageal reflux disease)     Intractable ophthalmic migraine     Dry eye syndrome     Hemorrhoids, internal 06/01/2003     Past Medical History:   Diagnosis Date    Allergy, unspecified not elsewhere classified     Anxiety state, unspecified     Breast calcification seen on mammogram 2011    bilaterally.       Bursitis of hip
Graham Score 15. All of the following interventions have been implemented to prevent pressure injury:    SKIN ASSESSMENT (S)  Dual skin assessment completed at shift change: Yes  Name of second RN who completed Dual Skin Assessment: IESHA Russell  Picture of wound uploaded to EMR: Yes  Venelex ordered and given per protocol: Yes  Wound care consulted if wounds present: NA    SURFACE (S)  Laury air pump or specialty bed ordered: Yes  Type of bed: laury air pump  Only white chux used with specialty surfaces (no green chux used): Yes  Waffle cushion used for chair positioning: Yes    KEEP MOVING (K)  Mobility status (Bedrest, Chairbound, UWA x 1 assist , 2 assist, Max assist): 2 assist  Q2 hour turns documented: Yes  Refusals to turn and education provided documented: NA  Device used to float heels: heels up  PT/OT consulted: Yes    INCONTINENCE (I)  Incontinence status assessed Q2 hours: Yes  External catheter in use: yes  Barrier cream in use: yes    NUTRITION (N)  I/O's documented every 8 hours: Yes  Oral supplements ordered if appropriate: Yes  Nutrition services consulted: Yes    All concerns about new DTI's must be escalated directly to attending MD, charge nurse, [address] [place] [address] and nurse director.
Hospitalist    Note husbands cell phone is incorrect in computer      Correct number [phone] with , difficult to communicate over phone    He tried to read med names and doses off bottles, I could not understand well    He is coming back to the hospital, he is going to bring her medication bottles with him so we have an accurate list    Danisha Rhodes MD
Hospitalist Progress Note    NAME:   Eleno Christian   :    MRN: [id number]     Date/Time: 10/9/2023    Patient PCP: rBooklyn Vazquez MD    Estimated discharge date: stable for DC  Barriers: 247 care set up at home    Assessment / Plan:      Complicated UTI POA  Sepsis POA HR 94, RR 21, normal lactate  Generalized weakness POA  Inability to ambulate POA  Parkinson's disease POA  Baseline dementia POA  Brought in from home with increasing generalized weakness for days  Baseline uses a walker to ambulate, but not consistently               has an aide that comes in during the day              Memory has been declining for year, diagnosed with dementia                          Often forgets to use the walker, \"falls frequently\" per the               Now felt not safe at home by family  ED oriented x2, neuro exam non focal, ms seems at baseline  UA 50 -100 WBC, 0-5 RBC, 3+ bacteria, moderate epithelial cells  Portable chest x-ray with no airspace disease              + Cardiomegaly and mild interstitial edema  Head CT negative  Plan:   IV ceftriaxone X3 days. U Cx growing E Coli  Hold IV fluids with possible pulmonary interstitial edema  Continue home Parkinson's medications  PT and OT consults, recs SNF  One to one sitter given pt climbing bed and restless      Acute diastolic congestive heart failure POA LVEF 50-55% on last echo 2021  Chronic respiratory failure with hypoxia POA 2 liters O2 at bedtime  Moderate mitral regurgitation and aortic regurgitation POA  H/o A Fib, not on AC  Pulmonary hypertension PA pressure 50 in  POA  Baseline on home oxygen 2 liters at night  Nursing staff and ED report sats 89%, respiratory rate 21, placed on 2 L              Patient admits to shortness of breath  Reviewed chest x-ray, cardiomegaly and interstitial edema  proBNP 3K.   Echo EF of 50-55%. Increased wall thickness. Mitral Valve: Mild to moderate regurgitation. Tricuspid Valve:
Hospitalist Progress Note    NAME:   Mary Jiang   : [id number]   MRN: [id number]     Date/Time: 10/7/2023    Patient PCP: Kavitha Sanchez MD    Estimated discharge date:   Barriers:     Assessment / Plan:      Complicated UTI POA  Sepsis POA HR 94, RR 21, normal lactate  Generalized weakness POA  Inability to ambulate POA  Parkinson's disease POA  Baseline dementia POA  Brought in from home with increasing generalized weakness for days  Baseline uses a walker to ambulate, but not consistently               has an aide that comes in during the day              Memory has been declining for year, diagnosed with dementia                          Often forgets to use the walker, \"falls frequently\" per the               Now felt not safe at home by family  ED oriented x2, neuro exam non focal, ms seems at baseline  UA 50 -100 WBC, 0-5 RBC, 3+ bacteria, moderate epithelial cells  Portable chest x-ray with no airspace disease              + Cardiomegaly and mild interstitial edema  Head CT negative  Plan:   IV ceftriaxone. U Cx growing GNR  Hold IV fluids with possible pulmonary interstitial edema  Follow-up on blood and urine cultures  Continue home Parkinson's medications  PT and OT consults, recs SNF  One to one sitter given pt climbing bed and restless      Acute diastolic congestive heart failure POA LVEF 50-55% on last echo 2021  Chronic respiratory failure with hypoxia POA 2 liters O2 at bedtime  Moderate mitral regurgitation and aortic regurgitation POA  Pulmonary hypertension PA pressure 50 in  POA  Baseline on home oxygen 2 liters at night  Nursing staff and ED report sats 89%, respiratory rate 21, placed on 2 L              Patient admits to shortness of breath  Reviewed chest x-ray, cardiomegaly and interstitial edema  proBNP 3K.   Echo EF of 50-55%. Increased wall thickness. Mitral Valve: Mild to moderate regurgitation. Tricuspid Valve: Moderate to severe regurgitation.
Physician Progress Note      [facility]                  [id number]  :                       1940  ADMIT DATE:       10/5/2023 9:33 PM  [address] [place] [address] DATE:        10/10/2023 12:00 PM  RESPONDING  PROVIDER #:        Jennifer Mckenna MD          QUERY TEXT:    Dr Jessica Quiñones  Patient admitted with complicated UTI. Noted documentation of sepsis HR 94 RR   22 in notes . In order to support the diagnosis of sepsis, please include   additional clinical indicators in your documentation. Or please document if   the diagnosis of sepsis has been ruled out after further study    The medical record reflects the following:  Risk Factors: CHF, Dementia, Parkinsons  Clinical Indicators: noted sepsis, afebrile, WBC 9-7, PCT 0.05, increased   fatigue and generalized weakness . Treatment: [email or web address]  Options provided:  -- Sepsis present as evidenced by, Please document evidence. -- Sepsis was ruled out after study  -- Other - I will add my own diagnosis  -- Disagree - Not applicable / Not valid  -- Disagree - Clinically unable to determine / Unknown  -- Refer to Clinical Documentation Reviewer    PROVIDER RESPONSE TEXT:    Sepsis is present as evidenced by HR >90, RR>20    Query created by: Leo Gasca on 10/9/2023 2:44 PM      QUERY TEXT:    Dr Jessica Quiñones  Pt admitted with acute on chronic Diastolic CHF. Pt noted to also have   moderate/severe Mitral/Tricuspid regurgitation, HTN. If possible, please   document in progress notes and discharge summary the etiology of CHF, if able   to be determined. The medical record reflects the following:  Risk Factors: DM, dementia,  Clinical Indicators: ***Reviewed chest x-ray, cardiomegaly and interstitial   edema, proBNP 3K; Echo EF of 50-55%. Increased wall thickness. ?Mitral?Valve:   Mild to moderate regurgitation. Tricuspid? Valve: Moderate to severe   regurgitation. Left? Atrium: Left atrium is dilated. Right? Atrium: Right
Pt.confused(baseline) and anxious, trying to get up from the bed, not following commands. MD notified and ordered 1:1 sitter.
Report received to this RN from Kathe Murcia in the ED    0315: Patient arrived to CMSU from ED. Swallow screen passed. Unable to complete admission database due to patient current orientation status. End of Shift Note    Bedside shift change report given to [place] (oncoming nurse) by Radha Garza RN (offgoing nurse). Report included the following information SBAR, Intake/Output, MAR, and Cardiac Rhythm atrial fib    Shift worked: 7p-7a     Shift summary and any significant changes:    See above     Concerns for physician to address:    Zone phone for oncoming shift:       Activity:     Number times ambulated in hallways past shift: 0  Number of times OOB to chair past shift: 0    Cardiac:   Cardiac Monitoring: Yes           Access:  Current line(s): PIV     Genitourinary:   Urinary status: voiding    Respiratory:      Chronic home O2 use?: N/A - unsure, Patient A&OX2  Incentive spirometer at bedside: NO       GI:     Current diet:  ADULT DIET; Regular  Passing flatus: YES  Tolerating current diet: YES       Pain Management:   Patient states pain is manageable on current regimen: YES    Skin:     Interventions: specialty bed, float heels, increase time out of bed, PT/OT consult, limit briefs, internal/external urinary devices, and nutritional support    Patient Safety:  Fall Score:    Interventions: bed/chair alarm, assistive device (walker, cane.  etc), gripper socks, pt to call before getting OOB, stay with me (per policy), and gait belt       Length of Stay:  Expected LOS: 3  Actual LOS: Tamar Tavares RN
cefTAZidime <=1 ug/mL Sensitive      cefTRIAXone <=1 ug/mL Sensitive      ciprofloxacin <=0.25 ug/mL Sensitive      gentamicin <=1 ug/mL Sensitive      levofloxacin <=0.12 ug/mL Sensitive      meropenem <=0.25 ug/mL Sensitive      nitrofurantoin <=16 ug/mL Sensitive      piperacillin-tazobactam <=4 ug/mL Sensitive      tobramycin <=1 ug/mL Sensitive      trimethoprim-sulfamethoxazole <=20 ug/mL Sensitive                                 ECHO:   10/05/23    ECHO (TTE) LIMITED (PRN CONTRAST/BUBBLE/STRAIN/3D) 10/06/2023  4:22 PM (Final)    Interpretation Summary    Left Ventricle: Low normal left ventricular systolic function with a visually estimated EF of 50 - 55%. Left ventricle size is normal. Increased wall thickness. Normal wall motion. Aortic Valve: Trileaflet valve. Sclerosis of the aortic valve cusp. Mild regurgitation. Mitral Valve: Mild to moderate regurgitation. Tricuspid Valve: Moderate to severe regurgitation. Left Atrium: Left atrium is dilated. Right Atrium: Right atrium is dilated. Signed by: Pavan Locke MD on 10/6/2023  4:22 PM    Procedures: see electronic medical records for all procedures/Xrays and details which were not copied into this note but were reviewed prior to creation of Plan.     Reviewed most current lab test results and cultures  YES  Reviewed most current radiology test results   YES  Review and summation of old records today    NO  Reviewed patient's current orders and MAR    YES  PMH/ reviewed - no change compared to H&P    ________________________________________________________________________      Total NON critical care TIME:  35  Minutes      Total CRITICAL CARE TIME Spent:   Minutes non procedure based          Comments   >50% of visit spent in counseling and coordination of care x    ________________________________________________________________________        Signed: July Munoz MD

## 2023-10-11 LAB
BACTERIA SPEC CULT: NORMAL
BACTERIA SPEC CULT: NORMAL
SERVICE CMNT-IMP: NORMAL
SERVICE CMNT-IMP: NORMAL